# Patient Record
Sex: MALE | Race: WHITE | NOT HISPANIC OR LATINO | Employment: FULL TIME | URBAN - METROPOLITAN AREA
[De-identification: names, ages, dates, MRNs, and addresses within clinical notes are randomized per-mention and may not be internally consistent; named-entity substitution may affect disease eponyms.]

---

## 2018-02-14 ENCOUNTER — CLINICAL SUPPORT (OUTPATIENT)
Dept: URGENT CARE | Facility: CLINIC | Age: 53
End: 2018-02-14

## 2018-02-14 DIAGNOSIS — Z00.00 PHYSICAL EXAM: ICD-10-CM

## 2018-02-14 DIAGNOSIS — Z02.83 ENCOUNTER FOR DRUG SCREENING: Primary | ICD-10-CM

## 2018-02-14 LAB
CTP QC/QA: YES
POC 5 PANEL DRUG SCREEN: NEGATIVE

## 2018-02-14 PROCEDURE — 80305 DRUG TEST PRSMV DIR OPT OBS: CPT | Mod: QW,S$GLB,, | Performed by: NURSE PRACTITIONER

## 2018-02-14 PROCEDURE — 99499 UNLISTED E&M SERVICE: CPT | Mod: S$GLB,,, | Performed by: PREVENTIVE MEDICINE

## 2018-02-14 PROCEDURE — 92552 PURE TONE AUDIOMETRY AIR: CPT | Mod: S$GLB,,, | Performed by: PREVENTIVE MEDICINE

## 2018-05-14 ENCOUNTER — HOSPITAL ENCOUNTER (EMERGENCY)
Facility: HOSPITAL | Age: 53
Discharge: HOME | End: 2018-05-14
Attending: EMERGENCY MEDICINE | Admitting: EMERGENCY MEDICINE
Payer: COMMERCIAL

## 2018-05-14 VITALS
BODY MASS INDEX: 27.31 KG/M2 | RESPIRATION RATE: 18 BRPM | HEIGHT: 64 IN | OXYGEN SATURATION: 94 % | SYSTOLIC BLOOD PRESSURE: 127 MMHG | DIASTOLIC BLOOD PRESSURE: 77 MMHG | TEMPERATURE: 99 F | HEART RATE: 75 BPM | WEIGHT: 160 LBS

## 2018-05-14 DIAGNOSIS — M25.522 ELBOW PAIN, LEFT: Primary | ICD-10-CM

## 2018-05-14 PROCEDURE — 99283 EMERGENCY DEPT VISIT LOW MDM: CPT

## 2018-05-14 PROCEDURE — 25000003 PHARM REV CODE 250: Performed by: PHYSICIAN ASSISTANT

## 2018-05-14 PROCEDURE — 63600175 PHARM REV CODE 636 W HCPCS: Performed by: PHYSICIAN ASSISTANT

## 2018-05-14 PROCEDURE — 90715 TDAP VACCINE 7 YRS/> IM: CPT | Performed by: PHYSICIAN ASSISTANT

## 2018-05-14 PROCEDURE — 90471 IMMUNIZATION ADMIN: CPT | Performed by: PHYSICIAN ASSISTANT

## 2018-05-14 RX ORDER — ACETAMINOPHEN 325 MG/1
650 TABLET ORAL
Status: COMPLETED | OUTPATIENT
Start: 2018-05-14 | End: 2018-05-14

## 2018-05-14 RX ORDER — CEPHALEXIN 500 MG/1
500 CAPSULE ORAL EVERY 12 HOURS
Qty: 20 CAPSULE | Refills: 0 | Status: SHIPPED | OUTPATIENT
Start: 2018-05-14 | End: 2018-05-24

## 2018-05-14 RX ADMIN — ACETAMINOPHEN 650 MG: 325 TABLET ORAL at 09:05

## 2018-05-14 RX ADMIN — CLOSTRIDIUM TETANI TOXOID ANTIGEN (FORMALDEHYDE INACTIVATED), CORYNEBACTERIUM DIPHTHERIAE TOXOID ANTIGEN (FORMALDEHYDE INACTIVATED), BORDETELLA PERTUSSIS TOXOID ANTIGEN (GLUTARALDEHYDE INACTIVATED), BORDETELLA PERTUSSIS FILAMENTOUS HEMAGGLUTININ ANTIGEN (FORMALDEHYDE INACTIVATED), BORDETELLA PERTUSSIS PERTACTIN ANTIGEN, AND BORDETELLA PERTUSSIS FIMBRIAE 2/3 ANTIGEN 0.5 ML: 5; 2; 2.5; 5; 3; 5 INJECTION, SUSPENSION INTRAMUSCULAR at 09:05

## 2018-05-15 NOTE — ED TRIAGE NOTES
Patient presents to the ED with family. Patient reports elbow pain x 1 day. Denies injuring elbow.

## 2018-05-15 NOTE — ED PROVIDER NOTES
Encounter Date: 5/14/2018       History     Chief Complaint   Patient presents with    Elbow Pain     left sided elbow pain and swelling; pt states might be a spider bite; area is red, swollen, and hot     51 yo M with no PMHx presents to ED for L elbow pain noticed after hitting it lightly on his  side window yesterday at 3pm. Notes swelling and redness. Denies Hx of similar symptoms. No medication taken PTA. No exacerbating symptoms. No medication PTA.           Review of patient's allergies indicates:  No Known Allergies  Past Medical History:   Diagnosis Date    Alcohol abuse     Tobacco abuse      Past Surgical History:   Procedure Laterality Date    NO PAST SURGERIES       Family History   Problem Relation Age of Onset    Hypertension Father     Stroke Father     Alcohol abuse Mother      Social History   Substance Use Topics    Smoking status: Current Every Day Smoker     Packs/day: 1.00     Years: 25.00    Smokeless tobacco: Not on file    Alcohol use Yes      Comment: 140 oz/week     Review of Systems   Constitutional: Negative for fever.   Respiratory: Negative for shortness of breath.    Cardiovascular: Negative for chest pain.   Gastrointestinal: Negative for abdominal pain, nausea and vomiting.   Musculoskeletal: Negative for back pain and neck pain.   Skin: Positive for color change.   Neurological: Negative for headaches.   All other systems reviewed and are negative.      Physical Exam     Initial Vitals [05/14/18 2110]   BP Pulse Resp Temp SpO2   120/76 82 17 98.2 °F (36.8 °C) 98 %      MAP       90.67         Physical Exam    Nursing note and vitals reviewed.  Constitutional: He appears well-developed and well-nourished. He is not diaphoretic. No distress.   HENT:   Head: Normocephalic and atraumatic.   Nose: Nose normal.   Eyes: Conjunctivae and EOM are normal. Right eye exhibits no discharge. Left eye exhibits no discharge.   Neck: Normal range of motion. No tracheal deviation  present. No JVD present.   Cardiovascular: Normal rate, regular rhythm and normal heart sounds. Exam reveals no friction rub.    No murmur heard.  Pulmonary/Chest: Breath sounds normal. No stridor. No respiratory distress. He has no wheezes. He has no rhonchi. He has no rales. He exhibits no tenderness.   Musculoskeletal:   Mild erythema and swelling to L elbow with minimal TTP. Full ROM of elbow without complication or pain. No fluctuance or induration. No abrasion or deformity. Radial pulses 2+ and equal.    Neurological: He is alert and oriented to person, place, and time.   Skin: Skin is warm and dry. No rash and no abscess noted. No erythema. No pallor.         ED Course   Procedures  Labs Reviewed - No data to display          Medical Decision Making:   History:   Old Medical Records: I decided to obtain old medical records.  Initial Assessment:   51 yo M with L elbow pain. Denies fever, numbness, and significant trauma.   ED Management:  Patient may have early/mild cellulitis. Less consistent with bursitis, but this remains possible. No abscess. I doubt septic joint.     Tetanus updated. Sent home on Keflex. Pain controlled. Advising PCP follow up. Strict return precautions discussed. Patient agreeable to plan.   Other:   I have discussed this case with another health care provider.       <> Summary of the Discussion: Case discussed with Dr. Patterson who is in agreement with my assessment and plan.                       Clinical Impression:   The encounter diagnosis was Elbow pain, left.                           Ryan Lazo PA-C  05/14/18 [id number]

## 2018-06-04 ENCOUNTER — OFFICE VISIT (OUTPATIENT)
Dept: URGENT CARE | Facility: CLINIC | Age: 53
End: 2018-06-04
Payer: COMMERCIAL

## 2018-06-04 VITALS
TEMPERATURE: 98 F | SYSTOLIC BLOOD PRESSURE: 142 MMHG | BODY MASS INDEX: 25.61 KG/M2 | HEIGHT: 64 IN | WEIGHT: 150 LBS | OXYGEN SATURATION: 98 % | RESPIRATION RATE: 18 BRPM | HEART RATE: 74 BPM | DIASTOLIC BLOOD PRESSURE: 89 MMHG

## 2018-06-04 DIAGNOSIS — F17.200 TOBACCO DEPENDENCE: ICD-10-CM

## 2018-06-04 DIAGNOSIS — M70.22 OLECRANON BURSITIS OF LEFT ELBOW: Primary | ICD-10-CM

## 2018-06-04 PROCEDURE — 99214 OFFICE O/P EST MOD 30 MIN: CPT | Mod: S$GLB,,, | Performed by: NURSE PRACTITIONER

## 2018-06-04 RX ORDER — SULFAMETHOXAZOLE AND TRIMETHOPRIM 800; 160 MG/1; MG/1
1 TABLET ORAL 2 TIMES DAILY
Qty: 14 TABLET | Refills: 0 | Status: SHIPPED | OUTPATIENT
Start: 2018-06-04 | End: 2018-06-11

## 2018-06-04 RX ORDER — NAPROXEN 500 MG/1
500 TABLET ORAL 2 TIMES DAILY WITH MEALS
Qty: 28 TABLET | Refills: 0 | Status: SHIPPED | OUTPATIENT
Start: 2018-06-04 | End: 2018-06-18

## 2018-06-05 NOTE — PROGRESS NOTES
"Subjective:       Patient ID: Bryn Camarillo Jr. is a 52 y.o. male.    Vitals:  height is 5' 4" (1.626 m) and weight is 68 kg (150 lb). His temperature is 97.6 °F (36.4 °C). His blood pressure is 142/89 (abnormal) and his pulse is 74. His respiration is 18 and oxygen saturation is 98%.     Chief Complaint: Joint Swelling    Elbow Injury   This is a new problem. The current episode started more than 1 month ago. The problem occurs constantly. The problem has been unchanged. Associated symptoms include joint swelling. Pertinent negatives include no abdominal pain, chest pain, chills, fever, headaches, nausea, rash, sore throat or vomiting. The symptoms are aggravated by bending. He has tried nothing for the symptoms. The treatment provided no relief.     Review of Systems   Constitution: Negative for chills and fever.   HENT: Negative for sore throat.    Eyes: Negative for blurred vision.   Cardiovascular: Negative for chest pain.   Respiratory: Negative for shortness of breath.    Skin: Negative for rash.   Musculoskeletal: Positive for joint pain and joint swelling. Negative for back pain.   Gastrointestinal: Negative for abdominal pain, diarrhea, nausea and vomiting.   Neurological: Negative for headaches.   Psychiatric/Behavioral: The patient is not nervous/anxious.    All other systems reviewed and are negative.      Objective:      Physical Exam   Constitutional: He is oriented to person, place, and time. He appears well-developed and well-nourished. He is cooperative.  Non-toxic appearance. He does not have a sickly appearance. He does not appear ill. No distress.   HENT:   Head: Normocephalic and atraumatic.   Right Ear: External ear normal.   Left Ear: External ear normal.   Nose: Nose normal.   Cardiovascular: Normal rate, regular rhythm and normal heart sounds.    Pulmonary/Chest: Effort normal and breath sounds normal. No accessory muscle usage. No apnea, no tachypnea and no bradypnea. No respiratory " distress. He has no decreased breath sounds. He has no wheezes. He has no rhonchi. He has no rales.   Musculoskeletal:        Left elbow: He exhibits swelling. He exhibits normal range of motion and no laceration. Tenderness found.        Arms:  Erythema, edema, and warmth noted to left posterior olecranon.    Neurological: He is alert and oriented to person, place, and time. He has normal strength. He displays no atrophy. No sensory deficit. He exhibits normal muscle tone. Coordination and gait normal.   Skin: Skin is warm. Capillary refill takes less than 2 seconds. He is not diaphoretic. There is erythema.   Psychiatric: He has a normal mood and affect. His speech is normal and behavior is normal.   Nursing note and vitals reviewed.      Assessment:       1. Olecranon bursitis of left elbow    2. Tobacco dependence        Plan:         Olecranon bursitis of left elbow  -     sulfamethoxazole-trimethoprim 800-160mg (BACTRIM DS) 800-160 mg Tab; Take 1 tablet by mouth 2 (two) times daily.  Dispense: 14 tablet; Refill: 0  -     naproxen (NAPROSYN) 500 MG tablet; Take 1 tablet (500 mg total) by mouth 2 (two) times daily with meals.  Dispense: 28 tablet; Refill: 0  -     Ambulatory referral to Orthopedics    Tobacco dependence  -     Ambulatory referral to Smoking Cessation Program      Instructed pt to follow up with ortho if issue continues. Pt verbalizes understanding.

## 2018-06-05 NOTE — PATIENT INSTRUCTIONS
Please follow up with your primary care provider if you are not feeling better in 7-10 days.    Please drink plenty of fluids.  Please get plenty of rest.    Please return here or go to the Emergency Department for any concerns or worsening of condition.    Rest, ice, compression and elevation to the affected joint or limb as needed.    Please follow up with your primary care doctor or specialist as needed.    If you  smoke, please stop smoking.    Bursitis of the Elbow (Olecranon)  Your elbow joint contains a small fluid-filled sac called a bursa. The bursa helps the muscles and tendons move smoothly over the bone. It also cushions and protects your elbow. Bursitis is when the bursa is inflamed or swollen. This is most often due to overuse of or injury to the elbow. Symptoms include swelling and pain. If the elbow is red and feels warm to the touch, the bursa itself may be infected.  In most cases, elbow bursitis resolves with medicine and self-care at home. It may take several weeks for the bursa to heal and the swelling to go away. In some cases, your healthcare provider may drain excess fluid from the bursa. Or, he or she may inject medicine directly into the bursa to help relieve symptoms. In severe cases, you may need surgery to remove the bursa may. If there is concern that the bursa is infected, your healthcare provider may prescribe antibiotics to treat the infection.    Home care  Your healthcare provider may prescribe medicine to help relieve pain and swelling. This may be an over-the-counter pain reliever or prescription pain medicine. Take all medicines as directed. To help treat or prevent infection, your provider may prescribe antibiotics. If these are prescribed, take them as directed until they are gone.  The following are general care guidelines:  · Apply an ice pack or bag of frozen peas wrapped in a thin towel to your elbow for 15 to 20 minutes at a time. Do this 3 to 4 times a day until pain and  swelling improve.  · Keep your elbow raised above the level of your heart whenever possible. This helps reduce swelling. When sitting or lying down, place your arm on a pillow that rests on your chest or on a pillow at your side.  · Use an elastic wrap around the elbow joint to compress the area while it is healing. Make the wrap snug but not tight to the point of causing pain.  · Rest your elbow to give it time to heal. You may need to wear an elbow pad to help protect and limit the movement of your elbow. During and after healing, avoid leaning on your elbows.  Follow-up care  Follow up with your healthcare provider, or as advised. If you have been referred to a specialist, make that appointment promptly.  When to seek medical advice  Call your healthcare provider right away if any of these occur:  · Fever of 100.4°F (38°C) or higher, or as advised  · Chills  · Increased pain, swelling, warmth, redness, or drainage from the joint  · Trouble moving the elbow joint  · Numbness or tingling in the hand  · Severe pain or swelling in forearm or hand  · Loss of pink color and slow return of color after squeezing fingertip or hand  Date Last Reviewed: 6/1/2016  © 1266-9425 [employer]. [address] [place] [address], [place] [address]. All rights reserved. This information is not intended as a substitute for professional medical care. Always follow your healthcare professional's instructions.

## 2018-06-30 ENCOUNTER — LAB VISIT (OUTPATIENT)
Dept: LAB | Facility: HOSPITAL | Age: 53
End: 2018-06-30
Attending: FAMILY MEDICINE
Payer: COMMERCIAL

## 2018-06-30 ENCOUNTER — OFFICE VISIT (OUTPATIENT)
Dept: FAMILY MEDICINE | Facility: CLINIC | Age: 53
End: 2018-06-30
Payer: COMMERCIAL

## 2018-06-30 VITALS
HEIGHT: 64 IN | BODY MASS INDEX: 26.02 KG/M2 | SYSTOLIC BLOOD PRESSURE: 138 MMHG | RESPIRATION RATE: 16 BRPM | HEART RATE: 80 BPM | WEIGHT: 152.38 LBS | TEMPERATURE: 98 F | DIASTOLIC BLOOD PRESSURE: 70 MMHG | OXYGEN SATURATION: 96 %

## 2018-06-30 DIAGNOSIS — Z00.00 VISIT FOR WELL MAN HEALTH CHECK: ICD-10-CM

## 2018-06-30 DIAGNOSIS — B18.2 CHRONIC HEPATITIS C WITHOUT HEPATIC COMA: ICD-10-CM

## 2018-06-30 DIAGNOSIS — R10.13 EPIGASTRIC PAIN: ICD-10-CM

## 2018-06-30 DIAGNOSIS — R10.13 EPIGASTRIC PAIN: Primary | ICD-10-CM

## 2018-06-30 LAB
ALBUMIN SERPL BCP-MCNC: 3.8 G/DL
ALP SERPL-CCNC: 95 U/L
ALT SERPL W/O P-5'-P-CCNC: 30 U/L
ANION GAP SERPL CALC-SCNC: 8 MMOL/L
AST SERPL-CCNC: 29 U/L
BASOPHILS # BLD AUTO: 0.04 K/UL
BASOPHILS NFR BLD: 0.5 %
BILIRUB SERPL-MCNC: 0.9 MG/DL
BUN SERPL-MCNC: 14 MG/DL
CALCIUM SERPL-MCNC: 9.8 MG/DL
CHLORIDE SERPL-SCNC: 105 MMOL/L
CHOLEST SERPL-MCNC: 139 MG/DL
CHOLEST/HDLC SERPL: 1.9 {RATIO}
CO2 SERPL-SCNC: 24 MMOL/L
COMPLEXED PSA SERPL-MCNC: 3.3 NG/ML
CREAT SERPL-MCNC: 0.9 MG/DL
DIFFERENTIAL METHOD: ABNORMAL
EOSINOPHIL # BLD AUTO: 0.3 K/UL
EOSINOPHIL NFR BLD: 3.2 %
ERYTHROCYTE [DISTWIDTH] IN BLOOD BY AUTOMATED COUNT: 12.7 %
EST. GFR  (AFRICAN AMERICAN): >60 ML/MIN/1.73 M^2
EST. GFR  (NON AFRICAN AMERICAN): >60 ML/MIN/1.73 M^2
GLUCOSE SERPL-MCNC: 95 MG/DL
HCT VFR BLD AUTO: 52.2 %
HDLC SERPL-MCNC: 72 MG/DL
HDLC SERPL: 51.8 %
HGB BLD-MCNC: 16.9 G/DL
IMM GRANULOCYTES # BLD AUTO: 0.03 K/UL
IMM GRANULOCYTES NFR BLD AUTO: 0.3 %
LDLC SERPL CALC-MCNC: 61 MG/DL
LIPASE SERPL-CCNC: 17 U/L
LYMPHOCYTES # BLD AUTO: 2.2 K/UL
LYMPHOCYTES NFR BLD: 25.2 %
MCH RBC QN AUTO: 31.8 PG
MCHC RBC AUTO-ENTMCNC: 32.4 G/DL
MCV RBC AUTO: 98 FL
MONOCYTES # BLD AUTO: 0.8 K/UL
MONOCYTES NFR BLD: 8.8 %
NEUTROPHILS # BLD AUTO: 5.5 K/UL
NEUTROPHILS NFR BLD: 62 %
NONHDLC SERPL-MCNC: 67 MG/DL
NRBC BLD-RTO: 0 /100 WBC
PLATELET # BLD AUTO: 272 K/UL
PMV BLD AUTO: 9.4 FL
POTASSIUM SERPL-SCNC: 4.3 MMOL/L
PROT SERPL-MCNC: 7.6 G/DL
RBC # BLD AUTO: 5.31 M/UL
SODIUM SERPL-SCNC: 137 MMOL/L
TRIGL SERPL-MCNC: 30 MG/DL
WBC # BLD AUTO: 8.85 K/UL

## 2018-06-30 PROCEDURE — 85025 COMPLETE CBC W/AUTO DIFF WBC: CPT

## 2018-06-30 PROCEDURE — 86677 HELICOBACTER PYLORI ANTIBODY: CPT

## 2018-06-30 PROCEDURE — 99396 PREV VISIT EST AGE 40-64: CPT | Mod: S$GLB,,, | Performed by: FAMILY MEDICINE

## 2018-06-30 PROCEDURE — 87902 NFCT AGT GNTYP ALYS HEP C: CPT

## 2018-06-30 PROCEDURE — 99214 OFFICE O/P EST MOD 30 MIN: CPT | Mod: 25,S$GLB,, | Performed by: FAMILY MEDICINE

## 2018-06-30 PROCEDURE — 36415 COLL VENOUS BLD VENIPUNCTURE: CPT | Mod: PO

## 2018-06-30 PROCEDURE — 80053 COMPREHEN METABOLIC PANEL: CPT

## 2018-06-30 PROCEDURE — 84153 ASSAY OF PSA TOTAL: CPT

## 2018-06-30 PROCEDURE — 83690 ASSAY OF LIPASE: CPT

## 2018-06-30 PROCEDURE — 80061 LIPID PANEL: CPT

## 2018-06-30 PROCEDURE — 87522 HEPATITIS C REVRS TRNSCRPJ: CPT

## 2018-06-30 PROCEDURE — 99999 PR PBB SHADOW E&M-EST. PATIENT-LVL III: CPT | Mod: PBBFAC,,, | Performed by: FAMILY MEDICINE

## 2018-06-30 NOTE — PROGRESS NOTES
"Routine Office Visit    Patient Name: Bryn Camarillo Jr.    : 1965  MRN: [id number]    Subjective:  Bryn is a 52 y.o. male who presents today for:    1. Stomach problems  Patient presenting with "stomach issues" for several months.  He relates this pain to home stressors as he and his wife are taking care of his grandkids.  He describes the pain as a cramping pain across the top of the abdomen.  He does have a history of alcohol abuse and tobacco abuse.  He quit using alcohol 5 days ago and has been just taking PPI and drinking water.  Stomach pain has since resolved.  Denies any n/v/d.  No changes in bowel habits.  No blood in stool and no black stools.  He las lost 8-10 pounds in the past 2 months, but relates that to working outside.      Past Medical History  Past Medical History:   Diagnosis Date    Alcohol abuse     Tobacco abuse        Past Surgical History  Past Surgical History:   Procedure Laterality Date    NO PAST SURGERIES         Family History  Family History   Problem Relation Age of Onset    Hypertension Father     Stroke Father     Alcohol abuse Mother        Social History  Social History     Social History    Marital status:      Spouse name: N/A    Number of children: N/A    Years of education: N/A     Occupational History    Not on file.     Social History Main Topics    Smoking status: Current Every Day Smoker     Packs/day: 1.00     Years: 25.00    Smokeless tobacco: Not on file    Alcohol use Yes      Comment: 140 oz/week    Drug use: No    Sexual activity: Yes     Partners: Female     Other Topics Concern    Not on file     Social History Narrative    No narrative on file       Current Medications  Current Outpatient Prescriptions on File Prior to Visit   Medication Sig Dispense Refill    folic acid (FOLVITE) 1 MG tablet Take 1 tablet (1 mg total) by mouth once daily.  0    omeprazole (PRILOSEC) 40 MG capsule Take 1 capsule (40 mg total) by mouth once " "daily. 30 capsule 3     No current facility-administered medications on file prior to visit.        Allergies   Review of patient's allergies indicates:  No Known Allergies    Review of Systems (Pertinent positives)  Review of Systems   Constitutional: Negative.    HENT: Negative.    Eyes: Negative.    Respiratory: Negative.    Cardiovascular: Negative.    Gastrointestinal: Positive for abdominal pain. Negative for heartburn, nausea and vomiting.   Genitourinary: Negative.    Musculoskeletal: Negative.    Skin: Negative.    Neurological: Negative.          /70 (BP Location: Left arm, Patient Position: Sitting, BP Method: Medium (Manual))   Pulse 80   Temp 97.5 °F (36.4 °C) (Oral)   Resp 16   Ht 5' 4" (1.626 m)   Wt 69.1 kg (152 lb 6.4 oz)   SpO2 96%   BMI 26.16 kg/m²     GENERAL APPEARANCE: in no apparent distress and well developed and well nourished  HEENT: PERRL, EOMI, Sclera clear, anicteric, Oropharynx clear, no lesions, Neck supple with midline trachea  NECK: normal, supple, no adenopathy, thyroid normal in size  RESPIRATORY: appears well, vitals normal, no respiratory distress, acyanotic, normal RR, chest clear, no wheezing, crepitations, rhonchi, normal symmetric air entry  HEART: regular rate and rhythm, S1, S2 normal, no murmur, click, rub or gallop.    ABDOMEN: abdomen is soft without tenderness, no masses, no hernias, no organomegaly, no rebound, no guarding. Suprapubic tenderness absent. No CVA tenderness.  SKIN: no rashes, no wounds, no other lesions  PSYCH: Alert, oriented x 3, thought content appropriate, speech normal, pleasant and cooperative, good eye contact, well groomed    Assessment/Plan:  Bryn Camarillo Jr. is a 52 y.o. male who presents today for :    Bryn was seen today for abdominal pain.    Diagnoses and all orders for this visit:    Epigastric pain  -     Lipase; Future  -     H. PYLORI ANTIBODY, IGG; Future    Chronic hepatitis C without hepatic coma  -     HEPATITIS C " GENOTYPE; Future      1.  Labs to be done today  2.  Will set up to see Dr. Dumont for Hep C  3.  Avoid alcoholic beverages and acidic beverages such as juices  4.  Patient to take PPI he has at home and drink water  5.  Encourage abstaining from tobacco as well as this will aggravate pain and increase risk for lung, colon, and pancreatic CA's amongst others    Yasmani Hernández MD

## 2018-06-30 NOTE — PROGRESS NOTES
"Well Man VISIT      CHIEF COMPLAINT  Chief Complaint   Patient presents with    Abdominal Pain     fasting       HPI  Bryn Camarillo Jr. is a 52 y.o. male who presents for physical.     Social Factors  Tobacco use: Yes   Ready to Quit: No  Alcohol: Yes 6 pack of beer a day  Intimate partner violence screening  "Do you feel safe in your current relationship?" Yes   "Have you ever been in a relationship in which your partner frightened you or hurt you?" No  Living Will/POA: No  Regular Exercise: No    Depression  Over the past two weeks, have you felt down, depressed, or hopeless? No  Over the past two weeks, have you felt little interest or pleasure in doing things? No    Reproductive Health  STD screening in last year: deferred  HIV screening: deferred    Screen for Chronic Disease  CHD Risk Factors: male gender and smoking/ tobacco exposure  Estimated body mass index is 26.16 kg/m² as calculated from the following:    Height as of this encounter: 5' 4" (1.626 m).    Weight as of this encounter: 69.1 kg (152 lb 6.4 oz).  Dyslipidemia screening needed: order 6/30/18  T2DM screening needed: order 6/30/18  Colonoscopy needed: order 6/30/18  PSA needed: order 6/30/18  AAA screening needed:n/a  Screen men 35 years and older, and men 20 to 34 years of age who have cardiovascular risk factors for dyslipidemia  Begin screening colonoscopies at 50 years of age in men of average risk, and continue until 75 years of age; offer fecal occult blood testing every year, flexible sigmoidoscopy every five years combined with fecal occult blood testing every three years, or colonoscopy every 10 years   The American U[employer] Association recommends offering PSA testing and digital rectal examination to well-informed men beginning at 40 years of age and continuing until life expectancy is less than 10 years  Screen once with ultrasonography in men 65 to 75 years of age if they have a family history or have smoked at [id number] " "cigarettes in their lifetime  Screen men with a sustained blood pressure greater than 135/80 mm Hg for T2DM      Immunizations  delayed    ALLERGIES and MEDS were verified.   PMHx, PSHx, FHx, SOCIALHx were updated as pertinent.    REVIEW OF SYSTEMS  Review of Systems   Constitutional: Negative.    HENT: Negative.    Eyes: Negative.    Respiratory: Negative.    Cardiovascular: Negative.    Gastrointestinal: Positive for abdominal pain. Negative for blood in stool, constipation, diarrhea, heartburn, melena, nausea and vomiting.   Genitourinary: Negative.    Musculoskeletal: Negative.    Skin: Negative.    Neurological: Negative.        PHYSICAL EXAM  VITAL SIGNS: /70 (BP Location: Left arm, Patient Position: Sitting, BP Method: Medium (Manual))   Pulse 80   Temp 97.5 °F (36.4 °C) (Oral)   Resp 16   Ht 5' 4" (1.626 m)   Wt 69.1 kg (152 lb 6.4 oz)   SpO2 96%   BMI 26.16 kg/m²   GEN: Well developed, Well nourished, No acute distress.  HENT: Normocephalic, Atraumatic, Bilateral external ears normal, Nose normal, Oropharynx moist, No oral exudates.   Eyes: PERRL, EOMI, Conjunctiva normal, No discharge.   Neck: Supple, No tenderness.  Lymphatic: No cervical or supraclavicular lymphadenopathy noted.   Cardiovascular: Normal heart rate, Normal rhythm, No murmurs, No rubs, No gallops.   Thorax & Lungs: Normal breath sounds, No respiratory distress, No wheezing.  Abdomen: Soft, No tenderness, Bowel sounds normal.  Genital: deferred  Skin: Warm, Dry, No erythema, No rash.   Extremities: No edema, No tenderness.       ASSESSMENT/PLAN    Bryn was seen today for abdominal pain.    Diagnoses and all orders for this visit:      Visit for [facility] health check  -     Comprehensive metabolic panel; Future  -     CBC auto differential; Future  -     Lipid panel; Future  -     Urinalysis; Future  -     PSA, Screening; Future  -     Case request GI: COLONOSCOPY         FOLLOW UP: 3 months or sooner if needed    Yasmani MCGOVERN" MD Betty

## 2018-07-02 LAB — H PYLORI IGG SERPL QL IA: POSITIVE

## 2018-07-06 LAB
HCV GENTYP SERPL NAA+PROBE: ABNORMAL
HCV RNA SERPL NAA+PROBE-LOG IU: 5.77 LOG (10) IU/ML
HCV RNA SERPL QL NAA+PROBE: DETECTED
HCV RNA SPEC NAA+PROBE-ACNC: ABNORMAL IU/ML

## 2018-07-10 ENCOUNTER — TELEPHONE (OUTPATIENT)
Dept: FAMILY MEDICINE | Facility: CLINIC | Age: 53
End: 2018-07-10

## 2018-07-10 NOTE — PROGRESS NOTES
Please let patient know that he tested positive for a bacteria in his stomach that could be the cause of his pain.  Please check to see if he has ever been treated for a stomach infection call H. Pylori.  If not, I am going to start him on treatment.    Thanks,  Dr. Hernández

## 2018-07-11 ENCOUNTER — OFFICE VISIT (OUTPATIENT)
Dept: FAMILY MEDICINE | Facility: CLINIC | Age: 53
End: 2018-07-11
Payer: COMMERCIAL

## 2018-07-11 ENCOUNTER — LAB VISIT (OUTPATIENT)
Dept: LAB | Facility: HOSPITAL | Age: 53
End: 2018-07-11
Attending: FAMILY MEDICINE
Payer: COMMERCIAL

## 2018-07-11 ENCOUNTER — TELEPHONE (OUTPATIENT)
Dept: FAMILY MEDICINE | Facility: CLINIC | Age: 53
End: 2018-07-11

## 2018-07-11 VITALS
RESPIRATION RATE: 17 BRPM | SYSTOLIC BLOOD PRESSURE: 128 MMHG | HEIGHT: 64 IN | OXYGEN SATURATION: 97 % | HEART RATE: 73 BPM | WEIGHT: 155.63 LBS | DIASTOLIC BLOOD PRESSURE: 76 MMHG | BODY MASS INDEX: 26.57 KG/M2 | TEMPERATURE: 98 F

## 2018-07-11 DIAGNOSIS — A04.8 H. PYLORI INFECTION: ICD-10-CM

## 2018-07-11 DIAGNOSIS — Z12.11 SCREEN FOR COLON CANCER: ICD-10-CM

## 2018-07-11 DIAGNOSIS — B18.2 CHRONIC HEPATITIS C WITHOUT HEPATIC COMA: Primary | ICD-10-CM

## 2018-07-11 DIAGNOSIS — B18.2 CHRONIC HEPATITIS C WITHOUT HEPATIC COMA: ICD-10-CM

## 2018-07-11 LAB
AFP SERPL-MCNC: 1.8 NG/ML
INR PPP: 1
PROTHROMBIN TIME: 10.8 SEC

## 2018-07-11 PROCEDURE — 99214 OFFICE O/P EST MOD 30 MIN: CPT | Mod: S$GLB,,, | Performed by: FAMILY MEDICINE

## 2018-07-11 PROCEDURE — 87902 NFCT AGT GNTYP ALYS HEP C: CPT

## 2018-07-11 PROCEDURE — 82105 ALPHA-FETOPROTEIN SERUM: CPT

## 2018-07-11 PROCEDURE — 87517 HEPATITIS B DNA QUANT: CPT

## 2018-07-11 PROCEDURE — 86704 HEP B CORE ANTIBODY TOTAL: CPT

## 2018-07-11 PROCEDURE — 99999 PR PBB SHADOW E&M-EST. PATIENT-LVL III: CPT | Mod: PBBFAC,,, | Performed by: FAMILY MEDICINE

## 2018-07-11 PROCEDURE — 85610 PROTHROMBIN TIME: CPT

## 2018-07-11 PROCEDURE — 82977 ASSAY OF GGT: CPT

## 2018-07-11 PROCEDURE — 86706 HEP B SURFACE ANTIBODY: CPT

## 2018-07-11 PROCEDURE — 36415 COLL VENOUS BLD VENIPUNCTURE: CPT | Mod: PN

## 2018-07-11 PROCEDURE — 87340 HEPATITIS B SURFACE AG IA: CPT

## 2018-07-11 RX ORDER — AMOXICILLIN 500 MG/1
1000 TABLET, FILM COATED ORAL 2 TIMES DAILY
Qty: 56 TABLET | Refills: 0 | Status: SHIPPED | OUTPATIENT
Start: 2018-07-11 | End: 2018-07-25

## 2018-07-11 RX ORDER — CLARITHROMYCIN 500 MG/1
500 TABLET, FILM COATED ORAL 2 TIMES DAILY
Qty: 28 TABLET | Refills: 0 | Status: SHIPPED | OUTPATIENT
Start: 2018-07-11 | End: 2018-07-25

## 2018-07-11 RX ORDER — OMEPRAZOLE 20 MG/1
20 CAPSULE, DELAYED RELEASE ORAL 2 TIMES DAILY
Qty: 28 CAPSULE | Refills: 0 | Status: SHIPPED | OUTPATIENT
Start: 2018-07-11 | End: 2018-09-06

## 2018-07-11 NOTE — TELEPHONE ENCOUNTER
----- Message from Yasmani Hernández MD sent at 7/10/2018  8:53 AM CDT -----  Please let patient know that he tested positive for a bacteria in his stomach that could be the cause of his pain.  Please check to see if he has ever been treated for a stomach infection call H. Pylori.  If not, I am going to start him on treatment.    Thanks,  Dr. Hernández

## 2018-07-11 NOTE — PROGRESS NOTES
Routine Office Visit    Patient Name: Bryn Camarillo Jr.    : 1965  MRN: [id number]    Subjective:  Bryn is a 52 y.o. male who presents today for:   Chief Complaint   Patient presents with    Hepatitis C       52 year old male comes in for evaluation of hepatitis C. He reports he was first told about hepatitis C a year ago. He was never treated. He states he does have a history of drug use, but has not done any in a long time.     The patient's recent blood tests show genotype 1a and Fib-4 score consistent with F0-F1.   Recent tests also show H. Pylori positive. He has never been treated for this.     Past Medical History  Past Medical History:   Diagnosis Date    Alcohol abuse     Tobacco abuse        Past Surgical History  Past Surgical History:   Procedure Laterality Date    NO PAST SURGERIES          Family History  Family History   Problem Relation Age of Onset    Hypertension Father     Stroke Father     Alcohol abuse Mother        Social History  Social History     Social History    Marital status:      Spouse name: N/A    Number of children: N/A    Years of education: N/A     Occupational History    Not on file.     Social History Main Topics    Smoking status: Current Every Day Smoker     Packs/day: 1.00     Years: 25.00    Smokeless tobacco: Not on file    Alcohol use Yes      Comment: 140 oz/week    Drug use: No    Sexual activity: Yes     Partners: Female     Other Topics Concern    Not on file     Social History Narrative    No narrative on file       Current Medications  Current Outpatient Prescriptions on File Prior to Visit   Medication Sig Dispense Refill    folic acid (FOLVITE) 1 MG tablet Take 1 tablet (1 mg total) by mouth once daily.  0    [DISCONTINUED] omeprazole (PRILOSEC) 40 MG capsule Take 1 capsule (40 mg total) by mouth once daily. 30 capsule 3     No current facility-administered medications on file prior to visit.        Allergies   Review of  "patient's allergies indicates:  No Known Allergies    Review of Systems   Constitutional: Negative for unexpected weight change.   HENT: Negative for ear pain and sore throat.    Eyes: Negative for visual disturbance.   Respiratory: Negative for shortness of breath.    Cardiovascular: Negative for chest pain.   Gastrointestinal: Negative for abdominal pain and blood in stool.   Endocrine: Negative for cold intolerance and heat intolerance.   Genitourinary: Negative for dysuria and frequency.   Skin: Negative for rash.   Neurological: Negative for weakness, numbness and headaches.   Hematological: Negative for adenopathy.   Psychiatric/Behavioral: Negative for suicidal ideas.       /76 (BP Location: Left arm, Patient Position: Sitting, BP Method: Medium (Manual))   Pulse 73   Temp 97.6 °F (36.4 °C) (Oral)   Resp 17   Ht 5' 4" (1.626 m)   Wt 70.6 kg (155 lb 10.3 oz)   SpO2 97%   BMI 26.72 kg/m²     Physical Exam   Constitutional: He appears well-developed and well-nourished.   HENT:   Head: Normocephalic.   Right Ear: External ear normal.   Left Ear: External ear normal.   Nose: Nose normal.   Mouth/Throat: No oropharyngeal exudate.   Neck: Normal range of motion. Neck supple. No tracheal deviation present.   Cardiovascular: Normal rate, regular rhythm, normal heart sounds and intact distal pulses.    No murmur heard.  Pulmonary/Chest: Effort normal and breath sounds normal. He has no wheezes. He has no rales.   Abdominal: Soft. Bowel sounds are normal. He exhibits no mass. There is no tenderness.   Musculoskeletal: He exhibits no edema.   Lymphadenopathy:     He has no cervical adenopathy.   Skin: He is not diaphoretic.   Vitals reviewed.      Lab Visit on 06/30/2018   Component Date Value Ref Range Status    Sodium 06/30/2018 137  136 - 145 mmol/L Final    Potassium 06/30/2018 4.3  3.5 - 5.1 mmol/L Final    Chloride 06/30/2018 105  95 - 110 mmol/L Final    CO2 06/30/2018 24  23 - 29 mmol/L Final    " Glucose 06/30/2018 95  70 - 110 mg/dL Final    BUN, Bld 06/30/2018 14  6 - 20 mg/dL Final    Creatinine 06/30/2018 0.9  0.5 - 1.4 mg/dL Final    Calcium 06/30/2018 9.8  8.7 - 10.5 mg/dL Final    Total Protein 06/30/2018 7.6  6.0 - 8.4 g/dL Final    Albumin 06/30/2018 3.8  3.5 - 5.2 g/dL Final    Total Bilirubin 06/30/2018 0.9  0.1 - 1.0 mg/dL Final    Comment: For infants and newborns, interpretation of results should be based  on gestational age, weight and in agreement with clinical  observations.  Premature Infant recommended reference ranges:  Up to 24 hours.............<8.0 mg/dL  Up to 48 hours............<12.0 mg/dL  3-5 days..................<15.0 mg/dL  6-29 days.................<15.0 mg/dL      Alkaline Phosphatase 06/30/2018 95  55 - 135 U/L Final    AST 06/30/2018 29  10 - 40 U/L Final    ALT 06/30/2018 30  10 - 44 U/L Final    Anion Gap 06/30/2018 8  8 - 16 mmol/L Final    eGFR if African American 06/30/2018 >60.0  >60 mL/min/1.73 m^2 Final    eGFR if non African American 06/30/2018 >60.0  >60 mL/min/1.73 m^2 Final    Comment: Calculation used to obtain the estimated glomerular filtration  rate (eGFR) is the CKD-EPI equation.       WBC 06/30/2018 8.85  3.90 - 12.70 K/uL Final    RBC 06/30/2018 5.31  4.60 - 6.20 M/uL Final    Hemoglobin 06/30/2018 16.9  14.0 - 18.0 g/dL Final    Hematocrit 06/30/2018 52.2  40.0 - 54.0 % Final    MCV 06/30/2018 98  82 - 98 fL Final    MCH 06/30/2018 31.8* 27.0 - 31.0 pg Final    MCHC 06/30/2018 32.4  32.0 - 36.0 g/dL Final    RDW 06/30/2018 12.7  11.5 - 14.5 % Final    Platelets 06/30/2018 272  150 - 350 K/uL Final    MPV 06/30/2018 9.4  9.2 - 12.9 fL Final    Immature Granulocytes 06/30/2018 0.3  0.0 - 0.5 % Final    Gran # (ANC) 06/30/2018 5.5  1.8 - 7.7 K/uL Final    Immature Grans (Abs) 06/30/2018 0.03  0.00 - 0.04 K/uL Final    Comment: Mild elevation in immature granulocytes is non specific and   can be seen in a variety of conditions  including stress response,   acute inflammation, trauma and pregnancy. Correlation with other   laboratory and clinical findings is essential.      Lymph # 06/30/2018 2.2  1.0 - 4.8 K/uL Final    Mono # 06/30/2018 0.8  0.3 - 1.0 K/uL Final    Eos # 06/30/2018 0.3  0.0 - 0.5 K/uL Final    Baso # 06/30/2018 0.04  0.00 - 0.20 K/uL Final    nRBC 06/30/2018 0  0 /100 WBC Final    Gran% 06/30/2018 62.0  38.0 - 73.0 % Final    Lymph% 06/30/2018 25.2  18.0 - 48.0 % Final    Mono% 06/30/2018 8.8  4.0 - 15.0 % Final    Eosinophil% 06/30/2018 3.2  0.0 - 8.0 % Final    Basophil% 06/30/2018 0.5  0.0 - 1.9 % Final    Differential Method 06/30/2018 Automated   Final    Cholesterol 06/30/2018 139  120 - 199 mg/dL Final    Comment: The National Cholesterol Education Program (NCEP) has set the  following guidelines (reference ranges) for Cholesterol:  Optimal.....................<200 mg/dL  Borderline High.............200-239 mg/dL  High........................> or = 240 mg/dL      Triglycerides 06/30/2018 30  30 - 150 mg/dL Final    Comment: The National Cholesterol Education Program (NCEP) has set the  following guidelines (reference values) for triglycerides:  Normal......................<150 mg/dL  Borderline High.............150-199 mg/dL  High........................200-499 mg/dL      HDL 06/30/2018 72  40 - 75 mg/dL Final    Comment: The National Cholesterol Education Program (NCEP) has set the  following guidelines (reference values) for HDL Cholesterol:  Low...............<40 mg/dL  Optimal...........>60 mg/dL      LDL Cholesterol 06/30/2018 61.0* 63.0 - 159.0 mg/dL Final    Comment: The National Cholesterol Education Program (NCEP) has set the  following guidelines (reference values) for LDL Cholesterol:  Optimal.......................<130 mg/dL  Borderline High...............130-159 mg/dL  High..........................160-189 mg/dL  Very High.....................>190 mg/dL      HDL/Chol Ratio 06/30/2018  51.8* 20.0 - 50.0 % Final    Total Cholesterol/HDL Ratio 06/30/2018 1.9* 2.0 - 5.0 Final    Non-HDL Cholesterol 06/30/2018 67  mg/dL Final    Comment: Risk category and Non-HDL cholesterol goals:  Coronary heart disease (CHD)or equivalent (10-year risk of CHD >20%):  Non-HDL cholesterol goal     <130 mg/dL  Two or more CHD risk factors and 10-year risk of CHD <= 20%:  Non-HDL cholesterol goal     <160 mg/dL  0 to 1 CHD risk factor:  Non-HDL cholesterol goal     <190 mg/dL      Lipase 06/30/2018 17  4 - 60 U/L Final    PSA, SCREEN 06/30/2018 3.3  0.00 - 4.00 ng/mL Final    Comment: PSA Expected levels:  Hormonal Therapy: <0.05 ng/ml  Prostatectomy: <0.01 ng/ml  Radiation Therapy: <1.00 ng/ml      HCV Qualitative Result 06/30/2018 DETECTED* Not detected Final    HCV Quantitative Result 06/30/2018 593,651* <12 IU/mL Final    HCV Quantitative Log 06/30/2018 5.77* <1.08 Log (10) IU/mL Final    Hepatitis C Virus Genotype 06/30/2018 1a*  Final    Comment: The HCV quantitation (viral load) procedure utilizes a   real-time reverse transcriptase polymerase chain reaction  (PCR) test from [facility].  The amplification  target is a conserved region of the HCV genome.  The  lower limit of quantitation is 12 IU/mL (1.08 Log IU/mL)  and the upper limit of quantitation is 100 million IU/mL  8.00 log IU/mL).  The qualitative limit of detection  is 12 IU/mL (1.08 Log IU/mL).  The hepatitis C virus (HCV) genotype was determined using  reverse transcription and PCR amplification of the 5   untranslated region and the core region of the HCV genome  followed by electrochemical detection (Kivra XT8).   Specimens with HCV viral loads <625 IU/mL cannot be  genotyped.  These tests should not be used to establish a diagnosis  of HCV infection.    The HCV genotype test uses commercial reagents that have  not been approved or cleared by the FDA.  The FDA has  determined that such clearance or approval is not  necessary.  The  performance characterist                           ics of this  procedure were determined by [facility].   Test performed at [facility],  [address], [place]  [address]     [phone]  Jose Juan Palomares MD  - Medical Director      H Pylori IgG Interp 06/30/2018 Positive* Negative Final         Assessment/Plan:  Bryn was seen today for hepatitis c.    Diagnoses and all orders for this visit:    Chronic hepatitis C without hepatic coma  -     Hepatitis B surface antigen; Future  -     Hepatitis B core antibody, total; Future  -     Hepatitis B surface antibody; Future  -     HCV FibroSURE; Future  -     AFP tumor marker; Future  -     Protime-INR; Future  -     HEPATITIS B VIRAL DNA, QUANTITATIVE; Future  Discussed with patient long term consequences of hepatitis C, as well as benefits, risks, and side effects of treatment. Discussed modes of transmission. Encouraged patient not to share items which may lead to contamination/transmission such as toothbrushes, razor blades, and nail clippers.  Hepatitis C labs done today.    Reviewed medication interactions.  Will need Ns5a test, since genotype 1.  Once labs back will send for prior authorization.   Also advised no alcohol while on treatment    H. pylori infection  -     amoxicillin (AMOXIL) 500 MG Tab; Take 2 tablets (1,000 mg total) by mouth 2 (two) times daily. for 14 days  -     clarithromycin (BIAXIN) 500 MG tablet; Take 1 tablet (500 mg total) by mouth 2 (two) times daily. for 14 days  -     omeprazole (PRILOSEC) 20 MG capsule; Take 1 capsule (20 mg total) by mouth 2 (two) times daily. for 14 days  Discussed treatment options  Will do 2 week course of above regimen.   Will need test of cure 1-2 months after treatment end with stool testing.    Screen for colon cancer  -     Fecal Immunochemical Test (iFOBT); Future  No family history of colon cancer and no rectal bleeding as such patient is a candidate for fecal occult  immuno-testing and patient prefers this.

## 2018-07-11 NOTE — TELEPHONE ENCOUNTER
Patient was notified of chart test result with charted recommendations. Patient said that he saw Dr Dumont this morning and he prescribed medications for his stomach.

## 2018-07-12 LAB
A2 MACROGLOB SERPL-MCNC: 189 MG/DL
ALT SERPL W P-5'-P-CCNC: 70 U/L (ref 7–55)
APO A-I SERPL-MCNC: 210 MG/DL
BILIRUB SERPL-MCNC: 0.9 MG/DL
BIOPREDICTIVE SERIAL NUMBER: ABNORMAL
FIBROSIS STAGE SERPL QL: ABNORMAL
FIBROTEST INTERPRETATION: ABNORMAL
FIBROTEST-ACTITEST COMMENT: ABNORMAL
GGT SERPL-CCNC: 39 U/L
HAPTOGLOB SERPL-MCNC: 154 MG/DL
HBV CORE AB SERPL QL IA: POSITIVE
HBV SURFACE AB SER-ACNC: NEGATIVE M[IU]/ML
HBV SURFACE AG SERPL QL IA: NEGATIVE
HEPATITIS B VIRAL DNA - QUANTITATIVE: <10 IU/ML
HEPATITIS B VIRUS DNA: NOT DETECTED
LIVER FIBR SCORE SERPL CALC.FIBROSURE: 0.17
LOG HBV IU/ML: <1 LOG (10) IU/ML
NECROINFLAMMAT INTERP: ABNORMAL
NECROINFLAMMATORY ACT GRADE SERPL QL: ABNORMAL
NECROINFLAMMATORY ACT SCORE SERPL: 0.37

## 2018-07-19 LAB
DACLATASVIR RESISTANCE RESULT1: NORMAL
ELBASVIR RESISTANCE RESULT1: NORMAL
HCV NS3 MUT DET ISLT GENOTYP: NORMAL
LEDIPASVIR RESISTANCE RESULT1: NORMAL
OMBITASVIR RESISTANCE: NORMAL
VELPATASVIR RESISTANCE RESULT1: NORMAL

## 2018-07-20 ENCOUNTER — OFFICE VISIT (OUTPATIENT)
Dept: OCCUPATIONAL MEDICINE | Facility: CLINIC | Age: 53
End: 2018-07-20
Payer: COMMERCIAL

## 2018-07-20 ENCOUNTER — TELEPHONE (OUTPATIENT)
Dept: FAMILY MEDICINE | Facility: CLINIC | Age: 53
End: 2018-07-20

## 2018-07-20 VITALS
WEIGHT: 155 LBS | BODY MASS INDEX: 26.46 KG/M2 | HEIGHT: 64 IN | RESPIRATION RATE: 12 BRPM | TEMPERATURE: 98 F | DIASTOLIC BLOOD PRESSURE: 80 MMHG | HEART RATE: 76 BPM | SYSTOLIC BLOOD PRESSURE: 120 MMHG

## 2018-07-20 DIAGNOSIS — Z02.83 ENCOUNTER FOR DRUG SCREENING: Primary | ICD-10-CM
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
## 2018-07-20 DIAGNOSIS — S51.812A LACERATION OF LEFT FOREARM, INITIAL ENCOUNTER: ICD-10-CM

## 2018-07-20 LAB
CTP QC/QA: YES
POC 10 PANEL DRUG SCREEN: NORMAL
POC BREATH ALCOHOL: NORMAL

## 2018-07-20 PROCEDURE — 80305 DRUG TEST PRSMV DIR OPT OBS: CPT | Mod: QW,S$GLB,, | Performed by: NURSE PRACTITIONER

## 2018-07-20 PROCEDURE — 12032 INTMD RPR S/A/T/EXT 2.6-7.5: CPT | Mod: S$GLB,,, | Performed by: NURSE PRACTITIONER

## 2018-07-20 PROCEDURE — 82075 ASSAY OF BREATH ETHANOL: CPT | Mod: S$GLB,,, | Performed by: NURSE PRACTITIONER

## 2018-07-20 PROCEDURE — 99203 OFFICE O/P NEW LOW 30 MIN: CPT | Mod: 25,S$GLB,, | Performed by: NURSE PRACTITIONER

## 2018-07-20 RX ORDER — SULFAMETHOXAZOLE AND TRIMETHOPRIM 800; 160 MG/1; MG/1
1 TABLET ORAL 2 TIMES DAILY
Qty: 20 TABLET | Refills: 0 | Status: SHIPPED | OUTPATIENT
Start: 2018-07-20 | End: 2018-07-20 | Stop reason: CLARIF

## 2018-07-20 RX ORDER — DOXYCYCLINE 100 MG/1
100 CAPSULE ORAL 2 TIMES DAILY
Qty: 20 CAPSULE | Refills: 0 | Status: SHIPPED | OUTPATIENT
Start: 2018-07-20 | End: 2018-07-30

## 2018-07-20 NOTE — PROCEDURES
Laceration Repair  Date/Time: 7/20/2018 4:28 PM  Performed by: SAMMY CALLES  Authorized by: SAMMY CALLES   Body area: upper extremity  Location details: left lower arm  Laceration length: 5.5 cm  Foreign bodies: no foreign bodies  Tendon involvement: none  Nerve involvement: none  Vascular damage: no  Anesthesia: local infiltration    Anesthesia:  Local Anesthetic: lidocaine 1% without epinephrine  Anesthetic total: 4 mL  Patient sedated: no  Irrigation solution: saline  Irrigation method: syringe  Amount of cleaning: extensive  Debridement: minimal  Degree of undermining: none  Skin closure: 4-0 Prolene  Number of sutures: 9  Technique: simple  Approximation: close  Approximation difficulty: complex  Dressing: non-stick sterile dressing  Patient tolerance: Patient tolerated the procedure well with no immediate complications  Comments: Jagged edged wound with some difficulty approximating wound edges due to irregular borders and missing skin. Range of motion, circulation and sensation intact prior to and post laceration procedure.  Patient tolerated procedure well and verbalizes understanding concerning wound care and medication prescribed for this injury.

## 2018-07-20 NOTE — PROGRESS NOTES
Subjective:       Patient ID: Bryn Camarillo Jr. is a 52 y.o. male.    Chief Complaint: Arm Injury (Forearm)    Pt works for [employer]. Pt states he was at work working on an icebox in the  cleaning it out. Then he tried to lift the ice box and was unaware that they has a flashing sticking out and it cut him on his left forearm. IJ       Arm Injury    The incident occurred 1 to 3 hours ago. The incident occurred at work. The injury mechanism was a direct blow. The pain is present in the left forearm. The quality of the pain is described as aching (stinging). The pain does not radiate. The pain is at a severity of 7/10. The pain is mild. The pain has been constant since the incident. Associated symptoms include tingling. Pertinent negatives include no chest pain. Nothing aggravates the symptoms. He has tried nothing for the symptoms. The treatment provided no relief.     Review of Systems   Constitution: Negative for chills and fever.   HENT: Negative for sore throat.    Eyes: Negative for blurred vision.   Cardiovascular: Negative for chest pain, dyspnea on exertion and palpitations.   Respiratory: Negative for cough and shortness of breath.    Hematologic/Lymphatic: Negative for bleeding problem.   Skin: Positive for color change. Negative for rash.        laceration left forarm   Musculoskeletal: Negative for back pain and joint pain.   Gastrointestinal: Negative for abdominal pain, diarrhea, heartburn, jaundice, nausea and vomiting.   Genitourinary: Negative for hematuria.   Neurological: Positive for tingling. Negative for dizziness, headaches, sensory change and vertigo.   Psychiatric/Behavioral: Negative for altered mental status. The patient is not nervous/anxious.    Allergic/Immunologic: Negative for persistent infections.       Objective:      Physical Exam   Constitutional: He is oriented to person, place, and time. He appears well-developed and well-nourished.   Eyes: EOM are normal. Pupils are  equal, round, and reactive to light.   Neck: Normal range of motion. Neck supple.   Cardiovascular: Normal rate and regular rhythm.    Pulmonary/Chest: Effort normal and breath sounds normal.   Abdominal: Soft. Bowel sounds are normal.   Musculoskeletal: Normal range of motion.        Left shoulder: Normal.        Left elbow: Normal.        Left wrist: Normal.        Left upper arm: Normal.        Left forearm: He exhibits tenderness, swelling and laceration. He exhibits no bony tenderness.        Left hand: Normal.   Neurological: He is alert and oriented to person, place, and time. He has normal strength. No cranial nerve deficit or sensory deficit. GCS eye subscore is 4. GCS verbal subscore is 5. GCS motor subscore is 6.   Reflex Scores:       Bicep reflexes are 2+ on the right side and 2+ on the left side.       Brachioradialis reflexes are 2+ on the right side and 2+ on the left side.  NV intact left upper extremity   Skin: Skin is warm and dry. Capillary refill takes 2 to 3 seconds. Laceration noted.        Psychiatric: He has a normal mood and affect. His behavior is normal.       Assessment:       1. Encounter for drug screening    2. Laceration of left forearm, initial encounter    3. Work related injury        Plan:       Bryn was seen today for arm injury.    Diagnoses and all orders for this visit:    Encounter for drug screening  -     POCT Rapid Drug Screen 10 Panel  -     POCT alcohol breath test    Laceration of left forearm, initial encounter  -     Discontinue: sulfamethoxazole-trimethoprim 800-160mg (BACTRIM DS) 800-160 mg Tab; Take 1 tablet by mouth 2 (two) times daily. for 10 days  -     doxycycline (VIBRAMYCIN) 100 MG Cap; Take 1 capsule (100 mg total) by mouth 2 (two) times daily. for 10 days  -     LACERATION REPAIR    Work related injury  -     Discontinue: sulfamethoxazole-trimethoprim 800-160mg (BACTRIM DS) 800-160 mg Tab; Take 1 tablet by mouth 2 (two) times daily. for 10 days  -      LACERATION REPAIR    Bryn was seen today for arm injury.    Diagnoses and all orders for this visit:    Encounter for drug screening  -     POCT Rapid Drug Screen 10 Panel  -     POCT alcohol breath test    Laceration of left forearm, initial encounter  -     Discontinue: sulfamethoxazole-trimethoprim 800-160mg (BACTRIM DS) 800-160 mg Tab; Take 1 tablet by mouth 2 (two) times daily. for 10 days  -     doxycycline (VIBRAMYCIN) 100 MG Cap; Take 1 capsule (100 mg total) by mouth 2 (two) times daily. for 10 days  -     LACERATION REPAIR    Work related injury  -     Discontinue: sulfamethoxazole-trimethoprim 800-160mg (BACTRIM DS) 800-160 mg Tab; Take 1 tablet by mouth 2 (two) times daily. for 10 days  -     LACERATION REPAIR        Medications Ordered This Encounter      doxycycline (VIBRAMYCIN) 100 MG Cap          Sig: Take 1 capsule (100 mg total) by mouth 2 (two) times daily. for 10 days          Dispense:  20 capsule          Refill:  0  Patient Instructions: Attention not to aggravate affected area, Apply ice 24-48 hours then apply heat/warm soaks, Keep dressing clean/dry/covered (no not get wound wet.  Wound checks daily monitor for signs of infection: area becomes more red, warm, more painful, drainage)   Restrictions: Home today, Disabled until next office visit  Follow-up in about 3 days (around 7/23/2018).

## 2018-07-20 NOTE — LETTER
[facility] [place] [address]  [place] [address]  Phone: [phone]  Fax: [phone]    Pt Name: Bryn Camarillo Jr.  Injury Date: 07/20/2018   Employee ID:  Date of First Treatment: 07/20/2018   Company: [employer]            Appointment Time: 02:05 PM Arrived:  2:20 PM CDT   Appointment Date: [unfilled] Time Out:1630 PM    Physician: Isis Huerta NP        Office Treatment: Bryn was seen today for arm injury.    Diagnoses and all orders for this visit:  EXAM   DRUG SCREEN  ALCOHOL SCREEN  LACERATION REPAIR  WOUND DRESSING  DISABLED UNTIL NEXT OFFICE VISIT        Encounter for drug screening  Laceration of left forearm, initial encounter  -     doxycycline (VIBRAMYCIN) 100 MG Cap; Take 1 capsule (100 mg total) by mouth 2 (two) times daily. for 10 days  Work related injury       Patient Instructions: Attention not to aggravate affected area, Apply ice 24-48 hours then apply heat/warm soaks, Keep dressing clean/dry/covered (no not get wound wet.  Wound checks daily monitor for signs of infection: area becomes more red, warm, more painful, drainage)    Restrictions: Home today, Disabled until next office visit       Return Appointment: 7/23/18 @ 0930 [facility]

## 2018-07-20 NOTE — TELEPHONE ENCOUNTER
----- Message from Ni Cruz sent at 7/20/2018 12:09 PM CDT -----  Contact: Wife - Sara  Patient is having reaction from one of his medications he does not know which one but he has a bad taste in his mouth and constipated. Please call to advise at [phone]    amoxicillin (AMOXIL) 500 MG Tab  clarithromycin (BIAXIN) 500 MG tablet  omeprazole (PRILOSEC) 20 MG capsule      [id number] - LELIA JOHNSON  [address] AT [facility] [address]

## 2018-07-20 NOTE — PATIENT INSTRUCTIONS
Extremity Laceration: Sutures, Staples, or Tape  A laceration is a cut through the skin. If it is deep, it may require stitches (sutures) or staples to close so it can heal. Minor cuts may be treated with surgical tape closures.   X-rays may be done if something may have entered the skin through the cut. You may also need a tetanus shot if you are not up to date on this vaccination.  Home care  · Follow the health care providers instructions on how to care for the cut.  · Wash your hands with soap and warm water before and after caring for your wound. This is to help prevent infection.  · Keep the wound clean and dry. If a bandage was applied and it becomes wet or dirty, replace it. Otherwise, leave it in place for the first 24 hours, then change it once a day or as directed.  · If sutures or staples were used, clean the wound daily:  · After removing the bandage, wash the area with soap and water. Use a wet cotton swab to loosen and remove any blood or crust that forms.  · After cleaning, keep the wound clean and dry. Talk with your doctor before applying any antibiotic ointment to the wound. Reapply the bandage.  · You may remove the bandage to shower as usual after the first 24 hours, but do not soak the area in water (no swimming) until the stitches or staples are removed.  · If surgical tape closures were used, keep the area clean and dry. If it becomes wet, blot it dry with a towel.  · The doctor may prescribe an antibiotic cream or ointment to prevent infection. Do not stop taking this medication until you have finished the prescribed course or the doctor tells you to stop. The doctor may also prescribe medications for pain. Follow the doctors instructions for taking these medications.  · Avoid activities that may reopen your wound.  Follow-up care  Follow up with your health care provider. Most skin wounds heal within ten days. However, an infection may sometimes occur despite proper treatment.  Therefore, check the wound daily for the signs of infection listed below. Stitches and staples should be removed within 7-14 days. If surgical tape closures were used, you may remove them after 10 days if they have not fallen off by then.   When to seek medical advice  Call your health care provider right away if any of these occur:  · Wound bleeding not controlled by direct pressure  · Signs of infection, including increasing pain in the wound, increasing wound redness or swelling, or pus or bad odor coming from the wound  · Fever of 100.4°F (38ºC) or higher or as directed by your healthcare provider  · Stitches or staples come apart or fall out or surgical tape falls off before 7 days  · Wound edges re-open  · Wound changes colors  · Numbness around the wound   · Decreased movement around the injured area  Date Last Reviewed: 6/14/2015  © 3116-5825 [employer]. [address] [place] [address], [place] [address]. All rights reserved. This information is not intended as a substitute for professional medical care. Always follow your healthcare professional's instructions.

## 2018-07-20 NOTE — TELEPHONE ENCOUNTER
Dr Dumont, see message from pt. I left a message for him to return my call. I asked him to call and speak to pharmacist about side affects, and to try OTC Miralax for constipation. Is there anything else I need to discuss with pt.

## 2018-07-20 NOTE — TELEPHONE ENCOUNTER
Spoke with wife as patient was at work. Advised to increase water. Chew on a piece of gum throughout the day, and to take Colace at night.   (2) good, crying

## 2018-07-23 ENCOUNTER — OFFICE VISIT (OUTPATIENT)
Dept: URGENT CARE | Facility: CLINIC | Age: 53
End: 2018-07-23
Payer: COMMERCIAL
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
## 2018-07-23 DIAGNOSIS — S51.812D LACERATION OF LEFT FOREARM, SUBSEQUENT ENCOUNTER: Primary | ICD-10-CM

## 2018-07-23 DIAGNOSIS — B18.2 CHRONIC HEPATITIS C WITHOUT HEPATIC COMA: Primary | ICD-10-CM

## 2018-07-23 PROCEDURE — 99213 OFFICE O/P EST LOW 20 MIN: CPT | Mod: 25,S$GLB,, | Performed by: NURSE PRACTITIONER

## 2018-07-23 NOTE — PROGRESS NOTES
Subjective:       Patient ID: Bryn Camarillo Jr. is a 52 y.o. male.    Chief Complaint: Arm Injury (Laceration Left Forearm)    Patient returns today for a follow-up of a laceration to his left forearm. He states that he is having 0/10 pain and is feeling good. RL  DOI: 7/20/18       Arm Injury    Incident onset: 7/20/18. The incident occurred at work. The pain is at a severity of 0/10. The patient is experiencing no pain. The pain has been improving since the incident. Pertinent negatives include no chest pain or numbness. The symptoms are aggravated by palpation.     Review of Systems   Constitution: Negative for chills, fever, weakness and malaise/fatigue.   HENT: Negative for nosebleeds.    Cardiovascular: Negative for chest pain and syncope.   Respiratory: Negative for cough, shortness of breath and wheezing.    Skin: Positive for itching. Negative for color change, dry skin and poor wound healing.        Laceration left FA   Musculoskeletal: Negative for back pain, joint pain and neck pain.   Gastrointestinal: Negative for abdominal pain.   Genitourinary: Negative for hematuria.   Neurological: Negative for dizziness, numbness and sensory change.   Psychiatric/Behavioral: Negative for altered mental status and depression. The patient does not have insomnia.        Objective:      Physical Exam   Constitutional: He is oriented to person, place, and time. He appears well-developed and well-nourished.   Neck: Normal range of motion. Neck supple.   Cardiovascular: Normal rate and regular rhythm.    Pulmonary/Chest: Effort normal and breath sounds normal.   Abdominal: Soft. Bowel sounds are normal.   Musculoskeletal: Normal range of motion. He exhibits no tenderness.        Left shoulder: Normal.        Left elbow: Normal.        Left wrist: Normal.        Left upper arm: Normal.        Left forearm: He exhibits laceration. He exhibits no tenderness, no swelling and no edema.   Neurological: He is alert and  oriented to person, place, and time. He has normal strength. No sensory deficit. GCS eye subscore is 4. GCS verbal subscore is 5. GCS motor subscore is 6.   Reflex Scores:       Bicep reflexes are 2+ on the right side and 2+ on the left side.       Brachioradialis reflexes are 2+ on the right side and 2+ on the left side.  Skin: Skin is warm and dry. Laceration noted. No erythema. No pallor.            Assessment:       1. Laceration of left forearm, subsequent encounter    2. Work related injury        Plan:       Bryn was seen today for arm injury.    Diagnoses and all orders for this visit:    Laceration of left forearm, subsequent encounter    Work related injury       Plan to remove sutures Monday 07/30/2018  Patient Instructions: Keep dressing clean/dry/covered, Attention not to aggravate affected area (Return to [place]. Do not get wound wet. Okay to have wound open to air at home only for a few hours after work. Dressing at night. Monitor for signs of infection ( increased pain, discharge, warmth at wound site.  Continue antibiotic as directed )   Restrictions: No lifting/pushing/pulling more than 10 lbs, Limited use of left hand and arm, No above the shoulder/overhead work (Must clean wound clean and dry.  Avoid dirty work environments .  Wound to be covered while at work.)  Follow-up in about 3 days (around 7/26/2018), or if symptoms worsen or fail to improve.

## 2018-07-23 NOTE — PATIENT INSTRUCTIONS
Extremity Laceration: Sutures, Staples, or Tape  A laceration is a cut through the skin. If it is deep, it may require stitches (sutures) or staples to close so it can heal. Minor cuts may be treated with surgical tape closures.   X-rays may be done if something may have entered the skin through the cut. You may also need a tetanus shot if you are not up to date on this vaccination.  Home care  · Follow the health care providers instructions on how to care for the cut.  · Wash your hands with soap and warm water before and after caring for your wound. This is to help prevent infection.  · Keep the wound clean and dry. If a bandage was applied and it becomes wet or dirty, replace it. Otherwise, leave it in place for the first 24 hours, then change it once a day or as directed.  · If sutures or staples were used, clean the wound daily:  · After removing the bandage, wash the area with soap and water. Use a wet cotton swab to loosen and remove any blood or crust that forms.  · After cleaning, keep the wound clean and dry. Talk with your doctor before applying any antibiotic ointment to the wound. Reapply the bandage.  · You may remove the bandage to shower as usual after the first 24 hours, but do not soak the area in water (no swimming) until the stitches or staples are removed.  · If surgical tape closures were used, keep the area clean and dry. If it becomes wet, blot it dry with a towel.  · The doctor may prescribe an antibiotic cream or ointment to prevent infection. Do not stop taking this medication until you have finished the prescribed course or the doctor tells you to stop. The doctor may also prescribe medications for pain. Follow the doctors instructions for taking these medications.  · Avoid activities that may reopen your wound.  Follow-up care  Follow up with your health care provider. Most skin wounds heal within ten days. However, an infection may sometimes occur despite proper treatment.  Therefore, check the wound daily for the signs of infection listed below. Stitches and staples should be removed within 7-14 days. If surgical tape closures were used, you may remove them after 10 days if they have not fallen off by then.   When to seek medical advice  Call your health care provider right away if any of these occur:  · Wound bleeding not controlled by direct pressure  · Signs of infection, including increasing pain in the wound, increasing wound redness or swelling, or pus or bad odor coming from the wound  · Fever of 100.4°F (38ºC) or higher or as directed by your healthcare provider  · Stitches or staples come apart or fall out or surgical tape falls off before 7 days  · Wound edges re-open  · Wound changes colors  · Numbness around the wound   · Decreased movement around the injured area  Date Last Reviewed: 6/14/2015  © 7814-1348 [employer]. [address] [place] [address]. All rights reserved. This information is not intended as a substitute for professional medical care. Always follow your healthcare professional's instructions.        Wound Check, No Infection  Your wound is healing as expected. There are no signs of infection.   Home care  Continue to care for your wound as directed.  · Cover your wound with a bandage unless your healthcare provider tells you not to.  · Gently clean your wound with soap and water when you shower.   · Unless told otherwise, avoid swimming and taking tub baths until your wound has healed.  Follow-up care  Follow up with your healthcare provider as advised.  · If you have sutures or staples, return as directed to have them removed. If they are not taken out on time, they may be harder to remove and scarring may be worse. Infection may develop.  · If surgical tape strips were used, you can remove them yourself if they have not fallen off by 10 days after they were applied.   When to seek medical advice  Call your healthcare provider  right away if any of these occur:  · Fever of 100.4ºF (38ºC) or higher, or as directed by your health care provider  · Symptoms of a wound infection, including:  ¨ Redness or swelling around the wound  ¨ Warmth coming from the wound  ¨ New or worsening pain  ¨ Red streaks around the wound  ¨ Draining pus  Date Last Reviewed: 8/24/2015  © 5832-0477 [employer]. [address] [place] [address], [place] [address]. All rights reserved. This information is not intended as a substitute for professional medical care. Always follow your healthcare professional's instructions.

## 2018-07-24 ENCOUNTER — TELEPHONE (OUTPATIENT)
Dept: PHARMACY | Facility: CLINIC | Age: 53
End: 2018-07-24

## 2018-07-26 ENCOUNTER — OFFICE VISIT (OUTPATIENT)
Dept: URGENT CARE | Facility: CLINIC | Age: 53
End: 2018-07-26
Payer: COMMERCIAL

## 2018-07-26 DIAGNOSIS — S51.812D LACERATION OF LEFT FOREARM, SUBSEQUENT ENCOUNTER: Primary | ICD-10-CM
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
## 2018-07-26 PROCEDURE — 99212 OFFICE O/P EST SF 10 MIN: CPT | Mod: S$GLB,,, | Performed by: NURSE PRACTITIONER

## 2018-07-26 NOTE — PATIENT INSTRUCTIONS
Wound Check, No Infection  Your wound is healing as expected. There are no signs of infection.   Home care  Continue to care for your wound as directed.  · Cover your wound with a bandage unless your healthcare provider tells you not to.  · Gently clean your wound with soap and water when you shower.   · Unless told otherwise, avoid swimming and taking tub baths until your wound has healed.  Follow-up care  Follow up with your healthcare provider as advised.  · If you have sutures or staples, return as directed to have them removed. If they are not taken out on time, they may be harder to remove and scarring may be worse. Infection may develop.  · If surgical tape strips were used, you can remove them yourself if they have not fallen off by 10 days after they were applied.   When to seek medical advice  Call your healthcare provider right away if any of these occur:  · Fever of 100.4ºF (38ºC) or higher, or as directed by your health care provider  · Symptoms of a wound infection, including:  ¨ Redness or swelling around the wound  ¨ Warmth coming from the wound  ¨ New or worsening pain  ¨ Red streaks around the wound  ¨ Draining pus  Date Last Reviewed: 8/24/2015  © 4730-7451 [employer]. [address] [place] [address], [place] [address]. All rights reserved. This information is not intended as a substitute for professional medical care. Always follow your healthcare professional's instructions.

## 2018-07-26 NOTE — LETTER
[facility] [place]  [address], [address]  [place] [facility] [address]  Phone: [phone]  Fax: [phone]    Pt Name: Bryn Camarillo Jr.  Injury Date: 07/20/2018   Employee ID: [id number] Date: 07/26/2018   Company: [employer]            Appointment Time: 11:00 AM Arrived: 9:14 AM CDT   Physician: Isis Huerta NP Time out: 9:47 AM       Office Treatment: Bryn was seen today for laceration.    Diagnoses and all orders for this visit:    Laceration of left forearm, subsequent encounter    Work related injury       Patient Instructions: Attention not to aggravate affected area, Keep dressing clean/dry/covered, Elevated affected area (okay to keep wound open to air at home after work.  do not wet wound.  Continue your Doxycycline )      Restrictions: No lifting/pushing/pulling more than 25 lbs, No above the shoulder/overhead work, Limited use of left hand and arm (Dressing to left arm while at work.  Must keep dressing clean and dry)       Return Appointment: 7/30/2018 at 11:00 AM

## 2018-07-30 ENCOUNTER — OFFICE VISIT (OUTPATIENT)
Dept: URGENT CARE | Facility: CLINIC | Age: 53
End: 2018-07-30
Payer: COMMERCIAL
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
## 2018-07-30 DIAGNOSIS — Z48.02 ENCOUNTER FOR REMOVAL OF SUTURES: ICD-10-CM

## 2018-07-30 DIAGNOSIS — S51.812D LACERATION OF LEFT FOREARM, SUBSEQUENT ENCOUNTER: Primary | ICD-10-CM

## 2018-07-30 PROCEDURE — 99213 OFFICE O/P EST LOW 20 MIN: CPT | Mod: 25,S$GLB,, | Performed by: NURSE PRACTITIONER

## 2018-07-30 NOTE — LETTER
[facility] [place]  [address], [address]  [place] [facility] [address]  Phone: [phone]  Fax: [phone]    Pt Name: Bryn Camarillo Jr.  Injury Date: 07/20/2018   Employee ID: [id number] Date: 07/30/2018   Company: [employer]            Appointment Time: 10:00 AM Arrived: 9:10 AM CDT   Physician: Isis Huerta NP Time out: 10:11 AM       Office Treatment: Bryn was seen today for laceration.    Diagnoses and all orders for this visit:    Laceration of left forearm, subsequent encounter    Work related injury    Encounter for removal of sutures       Patient Instructions: Keep dressing clean/dry/covered (MAKE SURE YOU FINISH ALL OF YOUR DOXYCYCLINE AS DIRECTED.  WHILE AT WORK CONTINUE TO KEEP WOUND PROTECTED)    Restrictions: NONE  Regular Duty  Patient is discharged from Occupational Health care.

## 2018-07-30 NOTE — PROGRESS NOTES
ubjective:       Patient ID: Bryn Camarillo Jr. is a 52 y.o. male.    Chief Complaint: Laceration (left forearm)    F/u for laceration left forearm (7/20/2018). Denies pain or discomfort. Pt reports a few of his sutures came out while changing his dressing. DENIES FEVER AND OR CHILLS.       Laceration    The incident occurred more than 1 week ago. The laceration is located on the left arm. The patient is experiencing no pain. He reports no foreign bodies present. His tetanus status is UTD.     Review of Systems   Constitution: Negative. Negative for chills and fever.   HENT: Negative for sore throat.    Eyes: Negative.  Negative for blurred vision and redness.   Cardiovascular: Negative for chest pain and dyspnea on exertion.   Respiratory: Negative.  Negative for cough and shortness of breath.    Skin: Negative for color change, poor wound healing and rash.   Musculoskeletal: Negative for back pain, joint pain, muscle weakness and stiffness.   Gastrointestinal: Negative for abdominal pain, diarrhea, heartburn, nausea and vomiting.   Genitourinary: Negative for bladder incontinence and frequency.   Neurological: Negative for headaches, numbness and sensory change.   Psychiatric/Behavioral: The patient is not nervous/anxious.        Objective:      Physical Exam   Constitutional: He is oriented to person, place, and time. He appears well-developed and well-nourished.   Neck: Normal range of motion. Neck supple.   Cardiovascular: Normal rate and regular rhythm.    Pulmonary/Chest: Effort normal and breath sounds normal.   Abdominal: Soft. Bowel sounds are normal.   Musculoskeletal: Normal range of motion. He exhibits no edema or tenderness.   Neurological: He is alert and oriented to person, place, and time. He displays normal reflexes. No cranial nerve deficit or sensory deficit. He exhibits normal muscle tone. Coordination normal.   Skin: Skin is warm and dry. Capillary refill takes 2 to 3 seconds. Laceration  noted. No rash noted. No erythema. No pallor.        Psychiatric: He has a normal mood and affect. His behavior is normal.       Assessment:       1. Laceration of left forearm, subsequent encounter    2. Work related injury    3. Encounter for removal of sutures        Plan:       Bryn was seen today for laceration.    Diagnoses and all orders for this visit:    Laceration of left forearm, subsequent encounter    Work related injury    Encounter for removal of sutures       DISCUSSED SMOKING CESSATION AND LITERATURE GIVEN  Patient Instructions: Keep dressing clean/dry/covered (MAKE SURE YOU FINISH ALL OF YOUR DOXYCYCLINE AS DIRECTED.  WHILE AT WORK CONTINUE TO KEEP WOUND PROTECTED)   Restrictions: Regular Duty  Follow-up if symptoms worsen or fail to improve.

## 2018-07-30 NOTE — PATIENT INSTRUCTIONS
Wound Check, No Infection  Your wound is healing as expected. There are no signs of infection.   Home care  Continue to care for your wound as directed.  · Cover your wound with a bandage unless your healthcare provider tells you not to.  · Gently clean your wound with soap and water when you shower.   · Unless told otherwise, avoid swimming and taking tub baths until your wound has healed.  Follow-up care  Follow up with your healthcare provider as advised.  · If you have sutures or staples, return as directed to have them removed. If they are not taken out on time, they may be harder to remove and scarring may be worse. Infection may develop.  · If surgical tape strips were used, you can remove them yourself if they have not fallen off by 10 days after they were applied.   When to seek medical advice  Call your healthcare provider right away if any of these occur:  · Fever of 100.4ºF (38ºC) or higher, or as directed by your health care provider  · Symptoms of a wound infection, including:  ¨ Redness or swelling around the wound  ¨ Warmth coming from the wound  ¨ New or worsening pain  ¨ Red streaks around the wound  ¨ Draining pus  Date Last Reviewed: 8/24/2015  © 2802-7699 [employer]. [address] [place] [address], [place] [address]. All rights reserved. This information is not intended as a substitute for professional medical care. Always follow your healthcare professional's instructions.

## 2018-07-30 NOTE — PROCEDURES
Procedures : SUTURE REMOVAL- 7 SUTURES REMOVED . 2 FELL OUT PRIOR TO THIS VISIT. TOLERATED WELL. 2 STERI-STRIPS APPLIED AND DRESSING SECURED.

## 2018-08-01 RX ORDER — LEDIPASVIR AND SOFOSBUVIR 90; 400 MG/1; MG/1
1 TABLET, FILM COATED ORAL DAILY
Qty: 28 TABLET | Refills: 1 | Status: SHIPPED | OUTPATIENT
Start: 2018-08-01 | End: 2018-08-21 | Stop reason: SDUPTHER

## 2018-08-02 ENCOUNTER — TELEPHONE (OUTPATIENT)
Dept: PHARMACY | Facility: CLINIC | Age: 53
End: 2018-08-02

## 2018-08-02 NOTE — TELEPHONE ENCOUNTER
Notified the patient we received the prescription for Harvoni, and it will require authorization from the insurance company. We will continue to follow up.

## 2018-08-07 ENCOUNTER — TELEPHONE (OUTPATIENT)
Dept: FAMILY MEDICINE | Facility: CLINIC | Age: 53
End: 2018-08-07

## 2018-08-07 NOTE — TELEPHONE ENCOUNTER
DOCUMENTATION ONLY:  Prior authorization for Sita approved from 08/06/2018 to 10/01/2018 x 8 weeks of treatment.   Case ID# [id number]    Co-pay: $unknown    Preferred Specialty Pharmacy:   [facility]   T: [phone]  F: [phone]    Sita co-pay coupon card information:   BIN: [id number]  RxPCN: [id number]  Issuer: ([id number]  Group Number: [id number]  ID#: [id number]

## 2018-08-07 NOTE — TELEPHONE ENCOUNTER
"----- Message from Yosvany Burns sent at 8/7/2018  8:22 AM CDT -----  Regarding: Harvoni 90/400mg x 8 weeks - [facility]   KUSH Clements: Harvoni 90/400mg prior authorization has been approved through 10/01/2018 x 8 weeks. Patients insurance requires the patient to fill through [facility]. Please send prescription to [facility], which has been added to patient EPIC profile. Patient has not been notified and provided with the necessary information to call and schedule a delivery (LVM).     To complete this in EPIC, the original order MUST be discontinued and re-typed as a new prescription with the updated pharmacy listed. Clicking "reorder" will continue to route the prescription to [facility] even if the pharmacy is changed. Please OPT the patient out of [facility] when the BPA is fired.    Thanks,  Yosvany Burns  [phone]  "

## 2018-08-08 ENCOUNTER — TELEPHONE (OUTPATIENT)
Dept: PHARMACY | Facility: CLINIC | Age: 53
End: 2018-08-08

## 2018-08-08 NOTE — TELEPHONE ENCOUNTER
Patient called to f/u on transition to [facility] for Harvoni - patient provided contact info for [facility] pharmacy and instructed to allow 24 hours for them to process his information and call for update.      Patient aware that if he does not get any direct information from [facility] regarding having an rx for him or an ability to proceed, he is to call [facility].  Will f/u with patient in approx 72 hours to determine progress.     ELIN Reynolds.Ph.  Clinical Pharmacist  [facility]  Phone: [phone]

## 2018-08-13 NOTE — TELEPHONE ENCOUNTER
Patient called to f/u on transition to [facility] - na lvm.     ELIN Reynolds.Ph.  Clinical Pharmacist  [facility]  Phone: [phone]

## 2018-08-15 ENCOUNTER — HOSPITAL ENCOUNTER (OUTPATIENT)
Dept: RADIOLOGY | Facility: HOSPITAL | Age: 53
Discharge: HOME | End: 2018-08-15
Attending: FAMILY MEDICINE
Payer: COMMERCIAL

## 2018-08-15 DIAGNOSIS — B18.2 CHRONIC HEPATITIS C WITHOUT HEPATIC COMA: ICD-10-CM

## 2018-08-15 PROCEDURE — 76705 ECHO EXAM OF ABDOMEN: CPT | Mod: TC

## 2018-08-15 PROCEDURE — 76705 ECHO EXAM OF ABDOMEN: CPT | Mod: 26,,, | Performed by: RADIOLOGY

## 2018-08-21 ENCOUNTER — TELEPHONE (OUTPATIENT)
Dept: FAMILY MEDICINE | Facility: CLINIC | Age: 53
End: 2018-08-21

## 2018-08-21 DIAGNOSIS — B18.2 CHRONIC HEPATITIS C WITHOUT HEPATIC COMA: Primary | ICD-10-CM

## 2018-08-21 RX ORDER — LEDIPASVIR AND SOFOSBUVIR 90; 400 MG/1; MG/1
1 TABLET, FILM COATED ORAL DAILY
Qty: 28 TABLET | Refills: 1 | Status: SHIPPED | OUTPATIENT
Start: 2018-08-21 | End: 2018-09-18

## 2018-08-21 NOTE — TELEPHONE ENCOUNTER
Please let patient know that he was approved for the hepatitis-C medication, however his insurance requires that he feels it out at and mail order pharmacy- [facility].  I sent in the prescription there, and he needs to call to arrange for delivery.  Once he calls them they will arrange for delivery.  Please stressed to the patient that he should not start the medicine until he sees me in the office again. Once, he has a delivery date, he should call to set up an appointment with me for after that delivery date so we can go over the correct way of taking the medication and possible interactions.  Again do not take the medicine no matter what the representative on the phone tells them because they are going to tell him to start the medicine as soon as he gets it.  It is extremely important that he follows these directions to give him the best opportunity of getting cured, as many of these medications interact with a lot of prescribed and over-the-counter medications

## 2018-08-29 ENCOUNTER — TELEPHONE (OUTPATIENT)
Dept: FAMILY MEDICINE | Facility: CLINIC | Age: 53
End: 2018-08-29

## 2018-08-29 NOTE — TELEPHONE ENCOUNTER
----- Message from Audra Chavez sent at 8/29/2018  3:01 PM CDT -----  Contact: Pt's Wife   Pt received his meds but has questions on how to take them. Please call [phone]

## 2018-08-29 NOTE — TELEPHONE ENCOUNTER
Patient informed to NOT Take this medication until he receives correct patient education on the new med and expressed understanding and agreed to make appointment for 9/6/18

## 2018-09-06 ENCOUNTER — OFFICE VISIT (OUTPATIENT)
Dept: FAMILY MEDICINE | Facility: CLINIC | Age: 53
End: 2018-09-06
Payer: COMMERCIAL

## 2018-09-06 VITALS
OXYGEN SATURATION: 99 % | DIASTOLIC BLOOD PRESSURE: 88 MMHG | RESPIRATION RATE: 20 BRPM | BODY MASS INDEX: 26.95 KG/M2 | TEMPERATURE: 98 F | HEIGHT: 64 IN | SYSTOLIC BLOOD PRESSURE: 130 MMHG | HEART RATE: 71 BPM | WEIGHT: 157.88 LBS

## 2018-09-06 DIAGNOSIS — F17.200 TOBACCO DEPENDENCE: ICD-10-CM

## 2018-09-06 DIAGNOSIS — F10.20 UNCOMPLICATED ALCOHOL DEPENDENCE: ICD-10-CM

## 2018-09-06 DIAGNOSIS — K82.4 GALLBLADDER POLYP: ICD-10-CM

## 2018-09-06 DIAGNOSIS — B18.2 CHRONIC HEPATITIS C WITHOUT HEPATIC COMA: Primary | ICD-10-CM

## 2018-09-06 DIAGNOSIS — A04.8 H. PYLORI INFECTION: ICD-10-CM

## 2018-09-06 DIAGNOSIS — Z23 NEED FOR VACCINATION: ICD-10-CM

## 2018-09-06 PROCEDURE — 99214 OFFICE O/P EST MOD 30 MIN: CPT | Mod: S$GLB,,, | Performed by: FAMILY MEDICINE

## 2018-09-06 PROCEDURE — 99999 PR PBB SHADOW E&M-EST. PATIENT-LVL IV: CPT | Mod: PBBFAC,,, | Performed by: FAMILY MEDICINE

## 2018-09-07 NOTE — PROGRESS NOTES
Routine Office Visit    Patient Name: Bryn Camarillo Jr.    : 1965  MRN: [id number]    Subjective:  Bryn is a 52 y.o. male who presents today for:   Chief Complaint   Patient presents with    Hepatitis C        52-year-old male comes in to initiate hepatitis-C treatment.  He has received his prescription for Harvoni,  And has not started as he was instructed to wait until he saw me 1st.    His labs from 2018 shows that he has a genotype 1a and had a viral load of 593,651.  His FibroSure score was F0 on 18.  Hepatitis-B testing showed negative surface antigen, negative surface antibody, the positive core antibody.  Subsequent hepatitis-B viral DNA showed no virus detected.    The patient subsequently had an ultrasound done of the right upper quadrant. The liver had normal echotexture and dimension.  The sonogram was positive for a 4 mm gallbladder polyp versus nonmobile calculus.  Since his last visit, the patient has completed H pylori treatment. He is due for test of cure.   also since last visit, the patient has dramatically decrease his alcohol.  He states that he no longer drinks alcohol during the week, and drinks only a few beers on the weekend but states that this weekend will be the last weekend he has any as he plans on starting treatment on Monday.      Past Medical History  Past Medical History:   Diagnosis Date    Alcohol abuse     Tobacco abuse        Past Surgical History  Past Surgical History:   Procedure Laterality Date    NO PAST SURGERIES          Family History  Family History   Problem Relation Age of Onset    Hypertension Father     Stroke Father     Alcohol abuse Mother        Social History  Social History     Socioeconomic History    Marital status:      Spouse name: Not on file    Number of children: Not on file    Years of education: Not on file    Highest education level: Not on file   Social Needs    Financial resource strain: Not on file     Food insecurity - worry: Not on file    Food insecurity - inability: Not on file    Transportation needs - medical: Not on file    Transportation needs - non-medical: Not on file   Occupational History    Not on file   Tobacco Use    Smoking status: Current Every Day Smoker     Packs/day: 1.00     Years: 25.00     Pack years: 25.00   Substance and Sexual Activity    Alcohol use: Yes     Comment: 140 oz/week    Drug use: No    Sexual activity: Yes     Partners: Female   Other Topics Concern    Not on file   Social History Narrative    Not on file       Current Medications  Current Outpatient Medications on File Prior to Visit   Medication Sig Dispense Refill    ledipasvir-sofosbuvir (HARVONI)  mg Tab Take 1 tablet by mouth once daily. 28 tablet 1    [DISCONTINUED] folic acid (FOLVITE) 1 MG tablet Take 1 tablet (1 mg total) by mouth once daily.  0    [DISCONTINUED] omeprazole (PRILOSEC) 20 MG capsule Take 1 capsule (20 mg total) by mouth 2 (two) times daily. for 14 days 28 capsule 0     No current facility-administered medications on file prior to visit.        Allergies   Review of patient's allergies indicates:  No Known Allergies    Review of Systems   Constitutional: Negative for unexpected weight change.   HENT: Negative for ear pain and sore throat.    Eyes: Negative for visual disturbance.   Respiratory: Negative for shortness of breath.    Cardiovascular: Negative for chest pain.   Gastrointestinal: Negative for abdominal pain and blood in stool.   Endocrine: Negative for cold intolerance and heat intolerance.   Genitourinary: Negative for dysuria and frequency.   Skin: Negative for rash.   Neurological: Negative for weakness, numbness and headaches.   Hematological: Negative for adenopathy.   Psychiatric/Behavioral: Negative for suicidal ideas.         /88 (BP Location: Left arm, Patient Position: Sitting, BP Method: Medium (Manual))   Pulse 71   Temp 97.6 °F (36.4 °C) (Oral)    "Resp 20   Ht 5' 4" (1.626 m)   Wt 71.6 kg (157 lb 13.6 oz)   SpO2 99%   BMI 27.09 kg/m²     Physical Exam   Constitutional: He appears well-developed and well-nourished.   HENT:   Head: Normocephalic.   Right Ear: External ear normal.   Left Ear: External ear normal.   Nose: Nose normal.   Mouth/Throat: No oropharyngeal exudate.   Neck: Normal range of motion. Neck supple. No tracheal deviation present.   Cardiovascular: Normal rate, regular rhythm, normal heart sounds and intact distal pulses.   No murmur heard.  Pulmonary/Chest: Effort normal and breath sounds normal. He has no wheezes. He has no rales.   Abdominal: Soft. Bowel sounds are normal. He exhibits no mass. There is no tenderness.   Musculoskeletal: He exhibits no edema.   Lymphadenopathy:     He has no cervical adenopathy.   Skin: He is not diaphoretic.   Vitals reviewed.      Assessment/Plan:  Bryn was seen today for hepatitis c.    Diagnoses and all orders for this visit:    Chronic hepatitis C without hepatic coma  -     Comprehensive metabolic panel; Future  -     CBC auto differential; Future  -     Comprehensive metabolic panel; Future  -     Hepatitis C RNA, quantitative, PCR; Future  -     Comprehensive metabolic panel; Future  -     Hepatitis C RNA, quantitative, PCR; Future  HCV Regimen: Harvoni; Length of treatment: 8 weeks as he is genotype 1a, he is treatment naive, non-cirrhotic, nonblack, and VL is <6 million  HCV treatment start date: 9/10/18, End of Week 4: 10/7/18; End of Week 8: 11/4/18; SVR12: 1/27/19     I discussed with the patient the importance of keeping the medicine in a secure location and to not allow access to anyone else.  Discussed with the patient that due to the cost of the medication if he were to lose hit or miss place it he would be extremely unlikely that the insurance company would replace.  We discussed possible side effects and if he has any he will call my medical assistant before he takes any " over-the-counter medicines.  I discussed with him that many over-the-counter medicines can interact with this,  As well as many prescribed medicines.  His white it is important that he contact me 1st so I can let him know if there is an interaction. I explained to him that most providers are not hepatitis-C providers and do not know the potential interactions with the medicine.    I did explain to him that if he were to have headache or joint pains it is okay to take Tylenol or ibuprofen as these do not interact.    If he has any abdominal pain, prior to starting any medicines he is to call me 1st.    The patient will do labs in 2 weeks and I will call him with those results.  This to make sure he is not having any elevated liver the medication.  He will then repeat labs at the end of 8 weeks and follow up in the office.    H. pylori infection  -     H. pylori antigen, stool; Future    Discussed with the patient that test of cure cannot be done in the blood as this will always be positive.  As such stool test ordered.      Gallbladder polyp  -     Ambulatory referral to Gastroenterology  Ultrasound consistent with likely gallbladder polyp which is less than 1 cm.  Discussed with the patient that these are usually benign however it is extremely important that he follow up with Gastroenterology for an evaluation.  I discussed with him that he may also need a surgical evaluation.      Need for vaccination  -     (In Office Administered) Hepatitis B Vaccine (Adult) (IM)  Patient will need to complete hepatitis B vaccination series    Uncomplicated alcohol dependence   patient congratulated on decreasing alcohol intake and efforts to stop drinking.  He reports no seizure-like activity or hallucinations when he decreased his intake.    Tobacco dependence    Importance of a smoking cessation discussed.  Patient not ready to quit smoking yet.        This office note has been dictated.  This dictation has been generated  using M-Modal Fluency Direct dictation; some phonetic errors may occur.

## 2018-09-10 ENCOUNTER — TELEPHONE (OUTPATIENT)
Dept: FAMILY MEDICINE | Facility: CLINIC | Age: 53
End: 2018-09-10

## 2018-09-10 NOTE — TELEPHONE ENCOUNTER
----- Message from Leslie Mendoza MA sent at 9/10/2018  5:29 PM CDT -----  Contact: Self      ----- Message -----  From: Marifer Marquez  Sent: 9/10/2018  11:08 AM  To: [facility] Staff    Pt is calling to speak with staff regarding his medication. Please call pt at [phone].

## 2018-09-10 NOTE — TELEPHONE ENCOUNTER
Patient wanted to make sure if he could drink orange juice with his medication. Informed him that yes.

## 2018-09-10 NOTE — TELEPHONE ENCOUNTER
----- Message from Marifer Marquez sent at 9/10/2018 11:08 AM CDT -----  Contact: Self  Pt is calling to speak with staff regarding his medication. Please call pt at [phone].

## 2018-09-12 ENCOUNTER — TELEPHONE (OUTPATIENT)
Dept: FAMILY MEDICINE | Facility: CLINIC | Age: 53
End: 2018-09-12

## 2018-09-12 ENCOUNTER — CLINICAL SUPPORT (OUTPATIENT)
Dept: FAMILY MEDICINE | Facility: CLINIC | Age: 53
End: 2018-09-12
Payer: COMMERCIAL

## 2018-09-12 ENCOUNTER — TELEPHONE (OUTPATIENT)
Dept: ADMINISTRATIVE | Facility: HOSPITAL | Age: 53
End: 2018-09-12

## 2018-09-12 DIAGNOSIS — Z23 NEED FOR HEPATITIS B VACCINATION: Primary | ICD-10-CM

## 2018-09-12 PROCEDURE — 90471 IMMUNIZATION ADMIN: CPT | Mod: S$GLB,,, | Performed by: FAMILY MEDICINE

## 2018-09-12 PROCEDURE — 90746 HEPB VACCINE 3 DOSE ADULT IM: CPT | Mod: S$GLB,,, | Performed by: FAMILY MEDICINE

## 2018-09-12 NOTE — TELEPHONE ENCOUNTER
----- Message from Yvonne Austin MA sent at 9/12/2018  2:02 PM CDT -----  Contact: self      ----- Message -----  From: Mateusz Gaines  Sent: 9/12/2018  12:28 PM  To: [facility] Staff    Pt devin he missed this morning's dosage of  ledipasvir-sofosbuvir (HARVONI)  mg Tab. Would like to know if he can take it tonight. Contact [phone].

## 2018-09-12 NOTE — TELEPHONE ENCOUNTER
I called the patient, and informed it is taking soon as he gets home.  He is to resume is number scheduled tomorrow morning

## 2018-09-12 NOTE — PROGRESS NOTES
(8:45 AM) - First dose of Hep B was given IM in the left deltoid. Tolerated well. Unable to remain in the clinic after injection. Had to go to  his grand kids. Will call the office to schedule second dose.

## 2018-09-12 NOTE — TELEPHONE ENCOUNTER
Contacted patient for GI referral. Patient stated he will call back to get Lamsa THOMAS's phone number when he can schedule the appt. "[employer]" can be reached at [phone].

## 2018-09-13 ENCOUNTER — TELEPHONE (OUTPATIENT)
Dept: FAMILY MEDICINE | Facility: CLINIC | Age: 53
End: 2018-09-13

## 2018-09-13 NOTE — TELEPHONE ENCOUNTER
----- Message from Roxy Chauhan sent at 9/12/2018  3:58 PM CDT -----  Contact: Mena [phone]  Patient's wife is calling to speak to the staff, in regards to the patient's medication. Please call at your earliest convenience.

## 2018-09-28 ENCOUNTER — LAB VISIT (OUTPATIENT)
Dept: LAB | Facility: HOSPITAL | Age: 53
End: 2018-09-28
Attending: FAMILY MEDICINE
Payer: COMMERCIAL

## 2018-09-28 DIAGNOSIS — B18.2 CHRONIC HEPATITIS C WITHOUT HEPATIC COMA: ICD-10-CM

## 2018-09-28 LAB
ALBUMIN SERPL BCP-MCNC: 3.8 G/DL
ALP SERPL-CCNC: 91 U/L
ALT SERPL W/O P-5'-P-CCNC: 14 U/L
ANION GAP SERPL CALC-SCNC: 6 MMOL/L
AST SERPL-CCNC: 18 U/L
BASOPHILS # BLD AUTO: 0.01 K/UL
BASOPHILS NFR BLD: 0.2 %
BILIRUB SERPL-MCNC: 0.7 MG/DL
BUN SERPL-MCNC: 14 MG/DL
CALCIUM SERPL-MCNC: 9.7 MG/DL
CHLORIDE SERPL-SCNC: 105 MMOL/L
CO2 SERPL-SCNC: 27 MMOL/L
CREAT SERPL-MCNC: 1 MG/DL
DIFFERENTIAL METHOD: ABNORMAL
EOSINOPHIL # BLD AUTO: 0.4 K/UL
EOSINOPHIL NFR BLD: 6 %
ERYTHROCYTE [DISTWIDTH] IN BLOOD BY AUTOMATED COUNT: 12.6 %
EST. GFR  (AFRICAN AMERICAN): >60 ML/MIN/1.73 M^2
EST. GFR  (NON AFRICAN AMERICAN): >60 ML/MIN/1.73 M^2
GLUCOSE SERPL-MCNC: 100 MG/DL
HCT VFR BLD AUTO: 47.6 %
HGB BLD-MCNC: 16.7 G/DL
LYMPHOCYTES # BLD AUTO: 2.4 K/UL
LYMPHOCYTES NFR BLD: 39.4 %
MCH RBC QN AUTO: 32.3 PG
MCHC RBC AUTO-ENTMCNC: 35.1 G/DL
MCV RBC AUTO: 92 FL
MONOCYTES # BLD AUTO: 0.5 K/UL
MONOCYTES NFR BLD: 8.9 %
NEUTROPHILS # BLD AUTO: 2.7 K/UL
NEUTROPHILS NFR BLD: 45.5 %
PLATELET # BLD AUTO: 214 K/UL
PMV BLD AUTO: 10.4 FL
POTASSIUM SERPL-SCNC: 4.6 MMOL/L
PROT SERPL-MCNC: 7 G/DL
RBC # BLD AUTO: 5.17 M/UL
SODIUM SERPL-SCNC: 138 MMOL/L
WBC # BLD AUTO: 5.97 K/UL

## 2018-09-28 PROCEDURE — 36415 COLL VENOUS BLD VENIPUNCTURE: CPT | Mod: PN

## 2018-09-28 PROCEDURE — 80053 COMPREHEN METABOLIC PANEL: CPT

## 2018-09-28 PROCEDURE — 85025 COMPLETE CBC W/AUTO DIFF WBC: CPT

## 2018-10-04 ENCOUNTER — TELEPHONE (OUTPATIENT)
Dept: FAMILY MEDICINE | Facility: CLINIC | Age: 53
End: 2018-10-04

## 2018-10-05 NOTE — TELEPHONE ENCOUNTER
Spoke with patient stated that when he called for medication refill, he called after on , despite having been told previously to not wait that long.  At that point his prior authorization had .  As such the pharmacy could not send his last for weeks of medication.    I called [facility] specialty pharmacy, and spent over an hour re-initiated prior authorization only to be told at the end and needed to call another number ([phone]) to get authorization for the patient's medications.    When I called, I was informed that there was no one available to could handle hepatitis C prior authorization at that and I was transferred to different locations until they could find a tech who could take prior authorization for hepatitis-C medications.  However she informed me that the case would need to be reviewed by a clinician and that no one would be available until morning.  She stated that she marked the case as urgent.  She gave me the following prior authorization [id number], and to call back after 7:30 AM central time at [phone].  I once again stressed the importance of not interrupting as this could cause resistance.

## 2018-10-05 NOTE — TELEPHONE ENCOUNTER
Patient made aware of medication delivery on 10/6/18. Patient states he will be home for delivery. No further questions or concerns.

## 2018-10-05 NOTE — TELEPHONE ENCOUNTER
"Spoke to staff at insurance at medication approved through 11/1/18.  Spoke with pharmacist and medication will be overnighted to patient address they have on file "[address]"  Someone has to be home to accept delivery.  "

## 2018-10-08 ENCOUNTER — TELEPHONE (OUTPATIENT)
Dept: FAMILY MEDICINE | Facility: CLINIC | Age: 53
End: 2018-10-08

## 2018-10-08 NOTE — TELEPHONE ENCOUNTER
----- Message from Karin Antoine sent at 10/5/2018  7:14 AM CDT -----  Contact: self  [employer] extended the p/a for ledipasvir-sofosbuvir (HARVONI)  mg Tab to 11/1/18

## 2018-10-11 ENCOUNTER — TELEPHONE (OUTPATIENT)
Dept: FAMILY MEDICINE | Facility: CLINIC | Age: 53
End: 2018-10-11

## 2018-10-11 NOTE — TELEPHONE ENCOUNTER
----- Message from Leslie Mendoza MA sent at 10/11/2018  9:58 AM CDT -----  Contact: Self  How do I handle this  ----- Message -----  From: Desiree Lubin  Sent: 10/11/2018   8:28 AM  To: [facility] Staff    Patient called to schedule hep b injections. Please call [phone]

## 2018-10-11 NOTE — TELEPHONE ENCOUNTER
Patient expressed understanding and will soraya back after the 17th to [facility] labs

## 2018-10-26 ENCOUNTER — PATIENT OUTREACH (OUTPATIENT)
Dept: ADMINISTRATIVE | Facility: HOSPITAL | Age: 53
End: 2018-10-26

## 2018-10-26 NOTE — PROGRESS NOTES
Spoke with pt in regards to overdue HM,pt has not had a colonoscopy done, would like have this scheduled.

## 2018-10-31 ENCOUNTER — OFFICE VISIT (OUTPATIENT)
Dept: URGENT CARE | Facility: CLINIC | Age: 53
End: 2018-10-31
Payer: COMMERCIAL

## 2018-10-31 VITALS
HEART RATE: 70 BPM | OXYGEN SATURATION: 98 % | BODY MASS INDEX: 26.8 KG/M2 | WEIGHT: 157 LBS | TEMPERATURE: 99 F | HEIGHT: 64 IN | SYSTOLIC BLOOD PRESSURE: 134 MMHG | DIASTOLIC BLOOD PRESSURE: 90 MMHG

## 2018-10-31 DIAGNOSIS — M70.22 OLECRANON BURSITIS OF LEFT ELBOW: Primary | ICD-10-CM

## 2018-10-31 PROCEDURE — 99214 OFFICE O/P EST MOD 30 MIN: CPT | Mod: 25,S$GLB,, | Performed by: NURSE PRACTITIONER

## 2018-10-31 PROCEDURE — 96372 THER/PROPH/DIAG INJ SC/IM: CPT | Mod: S$GLB,,, | Performed by: NURSE PRACTITIONER

## 2018-10-31 RX ORDER — DOXYCYCLINE 100 MG/1
100 CAPSULE ORAL 2 TIMES DAILY
Qty: 14 CAPSULE | Refills: 0 | Status: SHIPPED | OUTPATIENT
Start: 2018-10-31 | End: 2018-11-07

## 2018-10-31 RX ORDER — IBUPROFEN 800 MG/1
800 TABLET ORAL EVERY 8 HOURS PRN
Qty: 20 TABLET | Refills: 0 | Status: SHIPPED | OUTPATIENT
Start: 2018-10-31 | End: 2018-11-10

## 2018-10-31 RX ORDER — BETAMETHASONE SODIUM PHOSPHATE AND BETAMETHASONE ACETATE 3; 3 MG/ML; MG/ML
9 INJECTION, SUSPENSION INTRA-ARTICULAR; INTRALESIONAL; INTRAMUSCULAR; SOFT TISSUE
Status: COMPLETED | OUTPATIENT
Start: 2018-10-31 | End: 2018-10-31

## 2018-10-31 RX ORDER — LEDIPASVIR AND SOFOSBUVIR 90; 400 MG/1; MG/1
TABLET, FILM COATED ORAL
COMMUNITY
Start: 2018-10-05 | End: 2018-12-26 | Stop reason: ALTCHOICE

## 2018-10-31 RX ADMIN — BETAMETHASONE SODIUM PHOSPHATE AND BETAMETHASONE ACETATE 9 MG: 3; 3 INJECTION, SUSPENSION INTRA-ARTICULAR; INTRALESIONAL; INTRAMUSCULAR; SOFT TISSUE at 04:10

## 2018-10-31 NOTE — PATIENT INSTRUCTIONS
Start taking antiinflammatories and follow up with PCP as discussed in clinic.   If you were prescribed a narcotic medication, do not drive or operate heavy equipment or machinery while taking these medications.  You must understand that you've received an Urgent Care treatment only and that you may be released before all your medical problems are known or treated. You, the patient, will arrange for follow up care as instructed.  If your condition worsens we recommend that you receive another evaluation at the emergency room immediately or contact your primary medical clinics after hours call service to discuss your concerns.  Please return here or go to the Emergency Department for any concerns or worsening of condition.  Bursitis of the Elbow (Olecranon)  Your elbow joint contains a small fluid-filled sac called a bursa. The bursa helps the muscles and tendons move smoothly over the bone. It also cushions and protects your elbow. Bursitis is when the bursa is inflamed or swollen. This is most often due to overuse of or injury to the elbow. Symptoms include swelling and pain. If the elbow is red and feels warm to the touch, the bursa itself may be infected.  In most cases, elbow bursitis resolves with medicine and self-care at home. It may take several weeks for the bursa to heal and the swelling to go away. In some cases, your healthcare provider may drain excess fluid from the bursa. Or, he or she may inject medicine directly into the bursa to help relieve symptoms. In severe cases, you may need surgery to remove the bursa may. If there is concern that the bursa is infected, your healthcare provider may prescribe antibiotics to treat the infection.    Home care  Your healthcare provider may prescribe medicine to help relieve pain and swelling. This may be an over-the-counter pain reliever or prescription pain medicine. Take all medicines as directed. To help treat or prevent infection, your provider may prescribe  antibiotics. If these are prescribed, take them as directed until they are gone.  The following are general care guidelines:  · Apply an ice pack or bag of frozen peas wrapped in a thin towel to your elbow for 15 to 20 minutes at a time. Do this 3 to 4 times a day until pain and swelling improve.  · Keep your elbow raised above the level of your heart whenever possible. This helps reduce swelling. When sitting or lying down, place your arm on a pillow that rests on your chest or on a pillow at your side.  · Use an elastic wrap around the elbow joint to compress the area while it is healing. Make the wrap snug but not tight to the point of causing pain.  · Rest your elbow to give it time to heal. You may need to wear an elbow pad to help protect and limit the movement of your elbow. During and after healing, avoid leaning on your elbows.  Follow-up care  Follow up with your healthcare provider, or as advised. If you have been referred to a specialist, make that appointment promptly.  When to seek medical advice  Call your healthcare provider right away if any of these occur:  · Fever of 100.4°F (38°C) or higher, or as advised  · Chills  · Increased pain, swelling, warmth, redness, or drainage from the joint  · Trouble moving the elbow joint  · Numbness or tingling in the hand  · Severe pain or swelling in forearm or hand  · Loss of pink color and slow return of color after squeezing fingertip or hand  Date Last Reviewed: 6/1/2016  © 1268-3788 [employer]. [address] [place] [address], [place] [address]. All rights reserved. This information is not intended as a substitute for professional medical care. Always follow your healthcare professional's instructions.

## 2018-10-31 NOTE — PROGRESS NOTES
"Subjective:       Patient ID: Bryn Camarillo Jr. is a 52 y.o. male.    Vitals:  height is 5' 4" (1.626 m) and weight is 71.2 kg (157 lb). His temperature is 99.1 °F (37.3 °C). His blood pressure is 134/90 (abnormal) and his pulse is 70. His oxygen saturation is 98%.     Chief Complaint: Elbow Pain (left elbow)    Pt reports having left elbow pain and swelling for 2 weeks. He has a maniac.         Elbow Pain   This is a new problem. Episode onset: 14 days. Pertinent negatives include no abdominal pain, chest pain, chills, fever, headaches, nausea, rash, sore throat or vomiting.     Review of Systems   Constitution: Negative for chills and fever.   HENT: Negative for sore throat.    Eyes: Negative for blurred vision.   Cardiovascular: Negative for chest pain.   Respiratory: Negative for shortness of breath.    Skin: Negative for rash.   Musculoskeletal: Negative for back pain and joint pain.   Gastrointestinal: Negative for abdominal pain, diarrhea, nausea and vomiting.   Neurological: Negative for headaches.   Psychiatric/Behavioral: The patient is not nervous/anxious.        Objective:      Physical Exam   Constitutional: He is oriented to person, place, and time. He appears well-developed and well-nourished.   HENT:   Head: Normocephalic and atraumatic. Head is without abrasion, without contusion and without laceration.   Right Ear: External ear normal.   Left Ear: External ear normal.   Nose: Nose normal.   Mouth/Throat: Oropharynx is clear and moist.   Eyes: Conjunctivae, EOM and lids are normal. Pupils are equal, round, and reactive to light.   Neck: Trachea normal, full passive range of motion without pain and phonation normal. Neck supple.   Cardiovascular: Normal rate, regular rhythm and normal heart sounds.   Pulmonary/Chest: Effort normal and breath sounds normal. No stridor. No respiratory distress.   Musculoskeletal: Normal range of motion.   Neurological: He is alert and oriented to person, place, and " time.   Skin: Skin is warm, dry and intact. Capillary refill takes less than 2 seconds. No abrasion, no bruising, no burn, no ecchymosis, no laceration, no lesion and no rash noted. There is erythema.        fluctuant mass noted over olecranon process   Psychiatric: He has a normal mood and affect. His speech is normal and behavior is normal. Judgment and thought content normal. Cognition and memory are normal.   Nursing note and vitals reviewed.      Assessment:       1. Olecranon bursitis of left elbow        Plan:         Dr. Dukes also evaluated and agrees with diagnosis and plan of care.     Olecranon bursitis of left elbow  -     betamethasone acetate-betamethasone sodium phosphate injection 9 mg  -     doxycycline (VIBRAMYCIN) 100 MG Cap; Take 1 capsule (100 mg total) by mouth 2 (two) times daily. for 7 days  Dispense: 14 capsule; Refill: 0  -     ibuprofen (ADVIL,MOTRIN) 800 MG tablet; Take 1 tablet (800 mg total) by mouth every 8 (eight) hours as needed for Pain.  Dispense: 20 tablet; Refill: 0            Patient Instructions   Start taking antiinflammatories and follow up with PCP as discussed in clinic.   If you were prescribed a narcotic medication, do not drive or operate heavy equipment or machinery while taking these medications.  You must understand that you've received an Urgent Care treatment only and that you may be released before all your medical problems are known or treated. You, the patient, will arrange for follow up care as instructed.  If your condition worsens we recommend that you receive another evaluation at the emergency room immediately or contact your primary medical clinics after hours call service to discuss your concerns.  Please return here or go to the Emergency Department for any concerns or worsening of condition.  Bursitis of the Elbow (Olecranon)  Your elbow joint contains a small fluid-filled sac called a bursa. The bursa helps the muscles and tendons move smoothly over  the bone. It also cushions and protects your elbow. Bursitis is when the bursa is inflamed or swollen. This is most often due to overuse of or injury to the elbow. Symptoms include swelling and pain. If the elbow is red and feels warm to the touch, the bursa itself may be infected.  In most cases, elbow bursitis resolves with medicine and self-care at home. It may take several weeks for the bursa to heal and the swelling to go away. In some cases, your healthcare provider may drain excess fluid from the bursa. Or, he or she may inject medicine directly into the bursa to help relieve symptoms. In severe cases, you may need surgery to remove the bursa may. If there is concern that the bursa is infected, your healthcare provider may prescribe antibiotics to treat the infection.    Home care  Your healthcare provider may prescribe medicine to help relieve pain and swelling. This may be an over-the-counter pain reliever or prescription pain medicine. Take all medicines as directed. To help treat or prevent infection, your provider may prescribe antibiotics. If these are prescribed, take them as directed until they are gone.  The following are general care guidelines:  · Apply an ice pack or bag of frozen peas wrapped in a thin towel to your elbow for 15 to 20 minutes at a time. Do this 3 to 4 times a day until pain and swelling improve.  · Keep your elbow raised above the level of your heart whenever possible. This helps reduce swelling. When sitting or lying down, place your arm on a pillow that rests on your chest or on a pillow at your side.  · Use an elastic wrap around the elbow joint to compress the area while it is healing. Make the wrap snug but not tight to the point of causing pain.  · Rest your elbow to give it time to heal. You may need to wear an elbow pad to help protect and limit the movement of your elbow. During and after healing, avoid leaning on your elbows.  Follow-up care  Follow up with your  healthcare provider, or as advised. If you have been referred to a specialist, make that appointment promptly.  When to seek medical advice  Call your healthcare provider right away if any of these occur:  · Fever of 100.4°F (38°C) or higher, or as advised  · Chills  · Increased pain, swelling, warmth, redness, or drainage from the joint  · Trouble moving the elbow joint  · Numbness or tingling in the hand  · Severe pain or swelling in forearm or hand  · Loss of pink color and slow return of color after squeezing fingertip or hand  Date Last Reviewed: 6/1/2016  © 2564-3274 [employer]. [address] [place] [address], [place] [address]. All rights reserved. This information is not intended as a substitute for professional medical care. Always follow your healthcare professional's instructions.

## 2018-11-02 ENCOUNTER — PATIENT OUTREACH (OUTPATIENT)
Dept: ADMINISTRATIVE | Facility: HOSPITAL | Age: 53
End: 2018-11-02

## 2018-11-07 ENCOUNTER — LAB VISIT (OUTPATIENT)
Dept: LAB | Facility: HOSPITAL | Age: 53
End: 2018-11-07
Payer: COMMERCIAL

## 2018-11-07 DIAGNOSIS — B18.2 CHRONIC HEPATITIS C WITHOUT HEPATIC COMA: ICD-10-CM

## 2018-11-07 LAB
ALBUMIN SERPL BCP-MCNC: 3.8 G/DL
ALP SERPL-CCNC: 94 U/L
ALT SERPL W/O P-5'-P-CCNC: 21 U/L
ANION GAP SERPL CALC-SCNC: 8 MMOL/L
AST SERPL-CCNC: 24 U/L
BILIRUB SERPL-MCNC: 0.6 MG/DL
BUN SERPL-MCNC: 8 MG/DL
CALCIUM SERPL-MCNC: 9.7 MG/DL
CHLORIDE SERPL-SCNC: 107 MMOL/L
CO2 SERPL-SCNC: 22 MMOL/L
CREAT SERPL-MCNC: 0.9 MG/DL
EST. GFR  (AFRICAN AMERICAN): >60 ML/MIN/1.73 M^2
EST. GFR  (NON AFRICAN AMERICAN): >60 ML/MIN/1.73 M^2
GLUCOSE SERPL-MCNC: 60 MG/DL
POTASSIUM SERPL-SCNC: 4.4 MMOL/L
PROT SERPL-MCNC: 7.9 G/DL
SODIUM SERPL-SCNC: 137 MMOL/L

## 2018-11-07 PROCEDURE — 87522 HEPATITIS C REVRS TRNSCRPJ: CPT

## 2018-11-07 PROCEDURE — 80053 COMPREHEN METABOLIC PANEL: CPT

## 2018-11-07 PROCEDURE — 36415 COLL VENOUS BLD VENIPUNCTURE: CPT | Mod: PN

## 2018-11-11 LAB
HCV RNA SERPL NAA+PROBE-LOG IU: 1.15 LOG (10) IU/ML
HCV RNA SERPL QL NAA+PROBE: DETECTED IU/ML
HCV RNA SPEC NAA+PROBE-ACNC: 14 IU/ML

## 2018-11-19 ENCOUNTER — TELEPHONE (OUTPATIENT)
Dept: FAMILY MEDICINE | Facility: CLINIC | Age: 53
End: 2018-11-19

## 2018-11-19 NOTE — TELEPHONE ENCOUNTER
----- Message from Mahsa Cruz MA sent at 11/14/2018  1:39 PM CST -----  Contact: [phone]      ----- Message -----  From: Niki Johnson  Sent: 11/14/2018   1:27 PM  To: [facility] Staff    Pt is requesting his lab results. Lads were done on 11-7-18. Pls call pt [phone]. Thanks..........Holley

## 2018-12-03 ENCOUNTER — TELEPHONE (OUTPATIENT)
Dept: FAMILY MEDICINE | Facility: CLINIC | Age: 53
End: 2018-12-03

## 2018-12-10 ENCOUNTER — PATIENT OUTREACH (OUTPATIENT)
Dept: ADMINISTRATIVE | Facility: HOSPITAL | Age: 53
End: 2018-12-10

## 2018-12-11 ENCOUNTER — OFFICE VISIT (OUTPATIENT)
Dept: FAMILY MEDICINE | Facility: CLINIC | Age: 53
End: 2018-12-11
Payer: COMMERCIAL

## 2018-12-11 ENCOUNTER — LAB VISIT (OUTPATIENT)
Dept: LAB | Facility: HOSPITAL | Age: 53
End: 2018-12-11
Attending: FAMILY MEDICINE
Payer: COMMERCIAL

## 2018-12-11 VITALS
RESPIRATION RATE: 18 BRPM | HEART RATE: 80 BPM | BODY MASS INDEX: 27.33 KG/M2 | SYSTOLIC BLOOD PRESSURE: 118 MMHG | DIASTOLIC BLOOD PRESSURE: 82 MMHG | HEIGHT: 64 IN | WEIGHT: 160.06 LBS

## 2018-12-11 DIAGNOSIS — Z23 NEED FOR VACCINATION: ICD-10-CM

## 2018-12-11 DIAGNOSIS — F17.200 SMOKING: ICD-10-CM

## 2018-12-11 DIAGNOSIS — B18.2 CHRONIC HEPATITIS C WITHOUT HEPATIC COMA: Primary | ICD-10-CM

## 2018-12-11 DIAGNOSIS — B18.2 CHRONIC HEPATITIS C WITHOUT HEPATIC COMA: ICD-10-CM

## 2018-12-11 PROCEDURE — 90471 IMMUNIZATION ADMIN: CPT | Mod: S$GLB,,, | Performed by: FAMILY MEDICINE

## 2018-12-11 PROCEDURE — 90746 HEPB VACCINE 3 DOSE ADULT IM: CPT | Mod: S$GLB,,, | Performed by: FAMILY MEDICINE

## 2018-12-11 PROCEDURE — 99999 PR PBB SHADOW E&M-EST. PATIENT-LVL III: CPT | Mod: PBBFAC,,, | Performed by: FAMILY MEDICINE

## 2018-12-11 PROCEDURE — 99214 OFFICE O/P EST MOD 30 MIN: CPT | Mod: 25,S$GLB,, | Performed by: FAMILY MEDICINE

## 2018-12-11 PROCEDURE — 87522 HEPATITIS C REVRS TRNSCRPJ: CPT

## 2018-12-11 NOTE — PROGRESS NOTES
Routine Office Visit    Patient Name: Bryn Camarillo Jr.    : 1965  MRN: [id number]    Subjective:  Bryn is a 53 y.o. male who presents today for:   Chief Complaint   Patient presents with    Hepatitis C     53-year-old male comes in for and treatment hep C labs result.  He reports that he took all his medication.  He states that he had missed only 1 day but took a soon as she remembered.  He states that he tried to take at the same time every day but occasionally it will be off by couple of hours.  He reports that because the treatment he has greatly decreased the amount of smoking, and has also decrease his alcohol intake.  The patient expresses that he would like to try Chantix for smoking cessation.    Past Medical History  Past Medical History:   Diagnosis Date    Alcohol abuse     Tobacco abuse        Past Surgical History  Past Surgical History:   Procedure Laterality Date    NO PAST SURGERIES          Family History  Family History   Problem Relation Age of Onset    Hypertension Father     Stroke Father     Alcohol abuse Mother        Social History  Social History     Socioeconomic History    Marital status:      Spouse name: Not on file    Number of children: Not on file    Years of education: Not on file    Highest education level: Not on file   Social Needs    Financial resource strain: Not on file    Food insecurity - worry: Not on file    Food insecurity - inability: Not on file    Transportation needs - medical: Not on file    Transportation needs - non-medical: Not on file   Occupational History    Not on file   Tobacco Use    Smoking status: Current Every Day Smoker     Packs/day: 0.50     Years: 25.00     Pack years: 12.50    Smokeless tobacco: Never Used   Substance and Sexual Activity    Alcohol use: Yes     Comment: 140 oz/week    Drug use: No    Sexual activity: Yes     Partners: Female   Other Topics Concern    Not on file   Social History Narrative     "Not on file       Current Medications  Current Outpatient Medications on File Prior to Visit   Medication Sig Dispense Refill    HARVONI  mg Tab        No current facility-administered medications on file prior to visit.        Allergies   Review of patient's allergies indicates:  No Known Allergies    Review of Systems   Constitutional: Negative for unexpected weight change.   HENT: Negative for ear pain and sore throat.    Eyes: Negative for visual disturbance.   Respiratory: Negative for shortness of breath.    Cardiovascular: Negative for chest pain.   Gastrointestinal: Negative for abdominal pain and blood in stool.   Endocrine: Negative for cold intolerance and heat intolerance.   Genitourinary: Negative for dysuria and frequency.   Skin: Negative for rash.   Neurological: Negative for weakness, numbness and headaches.   Hematological: Negative for adenopathy.   Psychiatric/Behavioral: Negative for suicidal ideas.       /82 (BP Location: Left arm, Patient Position: Sitting, BP Method: Medium (Manual))   Pulse 80   Resp 18   Ht 5' 4" (1.626 m)   Wt 72.6 kg (160 lb 0.9 oz)   BMI 27.47 kg/m²     Physical Exam   Constitutional: He appears well-developed and well-nourished.   HENT:   Head: Normocephalic.   Right Ear: External ear normal.   Left Ear: External ear normal.   Nose: Nose normal.   Mouth/Throat: No oropharyngeal exudate.   Neck: Normal range of motion. Neck supple. No tracheal deviation present.   Cardiovascular: Normal rate, regular rhythm, normal heart sounds and intact distal pulses.   No murmur heard.  Pulmonary/Chest: Effort normal and breath sounds normal. He has no wheezes. He has no rales.   Abdominal: Soft. Bowel sounds are normal. He exhibits no mass. There is no tenderness.   Musculoskeletal: He exhibits no edema.   Lymphadenopathy:     He has no cervical adenopathy.   Skin: He is not diaphoretic.   Vitals reviewed.    Lab Visit on 11/07/2018   Component Date Value Ref Range " Status    HCV Log 11/07/2018 1.15* <1.08 Log (10) IU/mL Final    Comment: This procedure utilizes a real-time polymerase chain   reaction test from [employer].  The amplification   target is a conserved region of the HCV genome.  The lower  limit of quantitation is 12 IU/mL (1.08 Log IU/mL) and the   upper limit of quantitation is 100 million IU/mL (8.00 Log  IU/mL). The qualitative limit of detection is 12 IU/mL   (1.08 Log IU/mL).  Specimens reported as DETECTED but <12 IU/mL contain   detectable levels of hepatitis C RNA but the viral load is  below the limit of quantitation.  A Not detected result  does not rule out infection.  Test performed at [facility],  [address] , [place]  [address] [phone]  Jose Juan Palomares MD  - Medical Director      HCV, Qualitative 11/07/2018 DETECTED* Not detected IU/mL Final    HCV RNA Quant PCR 11/07/2018 14* <12 IU/mL Final    Sodium 11/07/2018 137  136 - 145 mmol/L Final    Potassium 11/07/2018 4.4  3.5 - 5.1 mmol/L Final    Chloride 11/07/2018 107  95 - 110 mmol/L Final    CO2 11/07/2018 22* 23 - 29 mmol/L Final    Glucose 11/07/2018 60* 70 - 110 mg/dL Final    BUN, Bld 11/07/2018 8  6 - 20 mg/dL Final    Creatinine 11/07/2018 0.9  0.5 - 1.4 mg/dL Final    Calcium 11/07/2018 9.7  8.7 - 10.5 mg/dL Final    Total Protein 11/07/2018 7.9  6.0 - 8.4 g/dL Final    Albumin 11/07/2018 3.8  3.5 - 5.2 g/dL Final    Total Bilirubin 11/07/2018 0.6  0.1 - 1.0 mg/dL Final    Comment: For infants and newborns, interpretation of results should be based  on gestational age, weight and in agreement with clinical  observations.  Premature Infant recommended reference ranges:  Up to 24 hours.............<8.0 mg/dL  Up to 48 hours............<12.0 mg/dL  3-5 days..................<15.0 mg/dL  6-29 days.................<15.0 mg/dL      Alkaline Phosphatase 11/07/2018 94  55 - 135 U/L Final    AST 11/07/2018 24  10 - 40 U/L Final    ALT 11/07/2018  21  10 - 44 U/L Final    Anion Gap 11/07/2018 8  8 - 16 mmol/L Final    eGFR if African American 11/07/2018 >60  >60 mL/min/1.73 m^2 Final    eGFR if non African American 11/07/2018 >60  >60 mL/min/1.73 m^2 Final    Comment: Calculation used to obtain the estimated glomerular filtration  rate (eGFR) is the CKD-EPI equation.      Lab Visit on 09/28/2018   Component Date Value Ref Range Status    WBC 09/28/2018 5.97  3.90 - 12.70 K/uL Final    RBC 09/28/2018 5.17  4.60 - 6.20 M/uL Final    Hemoglobin 09/28/2018 16.7  14.0 - 18.0 g/dL Final    Hematocrit 09/28/2018 47.6  40.0 - 54.0 % Final    MCV 09/28/2018 92  82 - 98 fL Final    MCH 09/28/2018 32.3* 27.0 - 31.0 pg Final    MCHC 09/28/2018 35.1  32.0 - 36.0 g/dL Final    RDW 09/28/2018 12.6  11.5 - 14.5 % Final    Platelets 09/28/2018 214  150 - 350 K/uL Final    MPV 09/28/2018 10.4  9.2 - 12.9 fL Final    Gran # (ANC) 09/28/2018 2.7  1.8 - 7.7 K/uL Final    Lymph # 09/28/2018 2.4  1.0 - 4.8 K/uL Final    Mono # 09/28/2018 0.5  0.3 - 1.0 K/uL Final    Eos # 09/28/2018 0.4  0.0 - 0.5 K/uL Final    Baso # 09/28/2018 0.01  0.00 - 0.20 K/uL Final    Gran% 09/28/2018 45.5  38.0 - 73.0 % Final    Lymph% 09/28/2018 39.4  18.0 - 48.0 % Final    Mono% 09/28/2018 8.9  4.0 - 15.0 % Final    Eosinophil% 09/28/2018 6.0  0.0 - 8.0 % Final    Basophil% 09/28/2018 0.2  0.0 - 1.9 % Final    Differential Method 09/28/2018 Automated   Final    Sodium 09/28/2018 138  136 - 145 mmol/L Final    Potassium 09/28/2018 4.6  3.5 - 5.1 mmol/L Final    Chloride 09/28/2018 105  95 - 110 mmol/L Final    CO2 09/28/2018 27  23 - 29 mmol/L Final    Glucose 09/28/2018 100  70 - 110 mg/dL Final    BUN, Bld 09/28/2018 14  6 - 20 mg/dL Final    Creatinine 09/28/2018 1.0  0.5 - 1.4 mg/dL Final    Calcium 09/28/2018 9.7  8.7 - 10.5 mg/dL Final    Total Protein 09/28/2018 7.0  6.0 - 8.4 g/dL Final    Albumin 09/28/2018 3.8  3.5 - 5.2 g/dL Final    Total Bilirubin  09/28/2018 0.7  0.1 - 1.0 mg/dL Final    Comment: For infants and newborns, interpretation of results should be based  on gestational age, weight and in agreement with clinical  observations.  Premature Infant recommended reference ranges:  Up to 24 hours.............<8.0 mg/dL  Up to 48 hours............<12.0 mg/dL  3-5 days..................<15.0 mg/dL  6-29 days.................<15.0 mg/dL      Alkaline Phosphatase 09/28/2018 91  55 - 135 U/L Final    AST 09/28/2018 18  10 - 40 U/L Final    ALT 09/28/2018 14  10 - 44 U/L Final    Anion Gap 09/28/2018 6* 8 - 16 mmol/L Final    eGFR if  09/28/2018 >60  >60 mL/min/1.73 m^2 Final    eGFR if non African American 09/28/2018 >60  >60 mL/min/1.73 m^2 Final    Comment: Calculation used to obtain the estimated glomerular filtration  rate (eGFR) is the CKD-EPI equation.              Assessment/Plan:  Bryn was seen today for hepatitis c.    Diagnoses and all orders for this visit:    Chronic hepatitis C without hepatic coma  -     Hepatitis C RNA, quantitative, PCR; Future  -     HEPATITIS C GENOTYPE; Future  -     HCV RNA NS5A RESISTANCE,BETTY. 1; Future  Patient is very low shows treatment failure.  The will recheck for resistance, and make sure no other genotype.  Will likely send for prior authorization for retreatment with [facility].      Need for vaccination  -     (In Office Administered) Hepatitis B Vaccine (Adult) (IM)  2nd dose of Hep B vaccine today.    Smoking  Will make sure what medication I can get approved for HCV, by insurance prior to starting patient on smoking cessation medication            This office note has been dictated.  This dictation has been generated using M-Modal Fluency Direct dictation; some phonetic errors may occur.

## 2018-12-26 ENCOUNTER — TELEPHONE (OUTPATIENT)
Dept: FAMILY MEDICINE | Facility: CLINIC | Age: 53
End: 2018-12-26

## 2018-12-26 ENCOUNTER — OFFICE VISIT (OUTPATIENT)
Dept: FAMILY MEDICINE | Facility: CLINIC | Age: 53
End: 2018-12-26
Payer: COMMERCIAL

## 2018-12-26 VITALS
OXYGEN SATURATION: 97 % | SYSTOLIC BLOOD PRESSURE: 120 MMHG | HEIGHT: 64 IN | TEMPERATURE: 98 F | WEIGHT: 159.63 LBS | RESPIRATION RATE: 18 BRPM | HEART RATE: 83 BPM | BODY MASS INDEX: 27.25 KG/M2 | DIASTOLIC BLOOD PRESSURE: 80 MMHG

## 2018-12-26 DIAGNOSIS — F17.200 SMOKING: Primary | ICD-10-CM

## 2018-12-26 DIAGNOSIS — B35.4 RINGWORM OF BODY: Primary | ICD-10-CM

## 2018-12-26 DIAGNOSIS — F17.200 SMOKING: ICD-10-CM

## 2018-12-26 PROCEDURE — 99214 OFFICE O/P EST MOD 30 MIN: CPT | Mod: S$GLB,,, | Performed by: FAMILY MEDICINE

## 2018-12-26 PROCEDURE — 99999 PR PBB SHADOW E&M-EST. PATIENT-LVL III: CPT | Mod: PBBFAC,,, | Performed by: FAMILY MEDICINE

## 2018-12-26 RX ORDER — VARENICLINE TARTRATE 0.5 (11)-1
KIT ORAL
Qty: 1 PACKAGE | Refills: 0 | Status: SHIPPED | OUTPATIENT
Start: 2018-12-26 | End: 2019-11-26

## 2018-12-26 RX ORDER — CLOTRIMAZOLE AND BETAMETHASONE DIPROPIONATE 10; .64 MG/G; MG/G
CREAM TOPICAL 2 TIMES DAILY
Qty: 45 G | Refills: 0 | Status: SHIPPED | OUTPATIENT
Start: 2018-12-26 | End: 2019-01-09

## 2018-12-26 RX ORDER — BUPROPION HYDROCHLORIDE 150 MG/1
TABLET, EXTENDED RELEASE ORAL
Qty: 57 TABLET | Refills: 0 | Status: SHIPPED | OUTPATIENT
Start: 2018-12-26 | End: 2019-01-25

## 2018-12-26 NOTE — TELEPHONE ENCOUNTER
I called patient, and explained to him how to take Zyban correctly.  Take twice a day  by least 8 hr, and 2nd dosage should be no later than 6:00 p.m..

## 2018-12-26 NOTE — PROGRESS NOTES
Routine Office Visit    Patient Name: Bryn Camarillo Jr.    : 1965  MRN: [id number]    Subjective:  Bryn is a 53 y.o. male who presents today for:   Chief Complaint   Patient presents with    Rash     back of neck 2 mos onset--itchy       53-year-old male comes in for evaluation of a rash on the back of his neck for the last 2 months.  He states that it is extremely itchy.  He states that there has been no improvement in the rash.  He is unsure what caused it, but states that he thinks in may be related to the laundry detergent the use at work.  He states that since the rash started it has spread from a small area on the side of his neck to the entire back of the neck.  The periphery of the lesion is somewhat scaly and reddened.  He denies any discharge from the area.  He denies any pain.  Denies any increased warmth.    The patient is a smoker, and is requesting prescription for Chantix for smoking cessation.  He has never taken this in the past.  He has tried to quit cold turkey unsuccessfully.  He has no history of depression, suicidal thoughts, or seizure disorder.    Past Medical History  Past Medical History:   Diagnosis Date    Alcohol abuse     Tobacco abuse        Past Surgical History  Past Surgical History:   Procedure Laterality Date    NO PAST SURGERIES          Family History  Family History   Problem Relation Age of Onset    Hypertension Father     Stroke Father     Alcohol abuse Mother        Social History  Social History     Socioeconomic History    Marital status:      Spouse name: Not on file    Number of children: Not on file    Years of education: Not on file    Highest education level: Not on file   Social Needs    Financial resource strain: Not on file    Food insecurity - worry: Not on file    Food insecurity - inability: Not on file    Transportation needs - medical: Not on file    Transportation needs - non-medical: Not on file   Occupational History    Not  "on file   Tobacco Use    Smoking status: Current Every Day Smoker     Packs/day: 0.50     Years: 25.00     Pack years: 12.50    Smokeless tobacco: Never Used   Substance and Sexual Activity    Alcohol use: Yes     Comment: 140 oz/week    Drug use: No    Sexual activity: Yes     Partners: Female   Other Topics Concern    Not on file   Social History Narrative    Not on file       Current Medications  Current Outpatient Medications on File Prior to Visit   Medication Sig Dispense Refill    HARVONI  mg Tab        No current facility-administered medications on file prior to visit.        Allergies   Review of patient's allergies indicates:  No Known Allergies    Review of Systems   Constitutional: Negative for chills and fever.   Respiratory: Negative for cough, choking, shortness of breath and wheezing.    Cardiovascular: Negative for chest pain and palpitations.   Skin: Positive for rash.   Psychiatric/Behavioral: Negative for confusion, decreased concentration, dysphoric mood, hallucinations and suicidal ideas. The patient is not nervous/anxious and is not hyperactive.          /80 (BP Location: Left arm, Patient Position: Sitting, BP Method: Medium (Manual))   Pulse 83   Temp 97.9 °F (36.6 °C) (Oral)   Resp 18   Ht 5' 4" (1.626 m)   Wt 72.4 kg (159 lb 9.8 oz)   SpO2 97%   BMI 27.40 kg/m²     Physical Exam   Constitutional: He appears well-developed and well-nourished.   HENT:   Head: Normocephalic.   Right Ear: External ear normal.   Left Ear: External ear normal.   Nose: Nose normal.   Mouth/Throat: No oropharyngeal exudate.   Neck: Normal range of motion. Neck supple. No tracheal deviation present.   Cardiovascular: Normal rate, regular rhythm, normal heart sounds and intact distal pulses.   No murmur heard.  Pulmonary/Chest: Effort normal and breath sounds normal. He has no wheezes. He has no rales.   Abdominal: Soft. Bowel sounds are normal. He exhibits no mass. There is no " tenderness.   Musculoskeletal: He exhibits no edema.   Lymphadenopathy:     He has no cervical adenopathy.   Skin: Rash (see image) noted. He is not diaphoretic.   Rash in posterior neck is slightly erythematous with a solitary border which is slightly raised   Vitals reviewed.        Assessment/Plan:  Bryn was seen today for rash.    Diagnoses and all orders for this visit:    Ringworm of body  -     clotrimazole-betamethasone 1-0.05% (LOTRISONE) cream; Apply topically 2 (two) times daily. for 14 days  Will treat with Lotrisone twice daily.  If there is no improvement within 2 weeks, will adjust treatment.  Discussed with patient that he can auto in ocular late other parts of his body if he touches the area and intact his another body part.  Also discussed not to that other people come in contact with the area.    Smoking  -     CHANTIX STARTING MONTH BOX 0.5 mg (11)- 1 mg (42) tablet; Take one 0.5mg tab by mouth once daily X3 days,then increase to one 0.5mg tab twice daily X4 days,then increase to one 1mg tab twice daily  Patient applauded on decision to quit smoking.  Risks and side effects of Chantix were discussed.  Patient is agreeable to proceed with the medication.  I explained to him how to use it.  He is to message over week before the starter pack finishes for prescription for continuation pack.        This office note has been dictated.  This dictation has been generated using M-Modal Fluency Direct dictation; some phonetic errors may occur.

## 2018-12-26 NOTE — TELEPHONE ENCOUNTER
Chantix was denied. Will try and send Zyban.    I called pharmacy and they confirmed this is covered.

## 2019-01-07 ENCOUNTER — CLINICAL SUPPORT (OUTPATIENT)
Dept: FAMILY MEDICINE | Facility: CLINIC | Age: 54
End: 2019-01-07
Payer: COMMERCIAL

## 2019-01-07 DIAGNOSIS — Z23 NEED FOR VACCINATION: Primary | ICD-10-CM

## 2019-01-07 PROCEDURE — 99999 PR PBB SHADOW E&M-EST. PATIENT-LVL I: CPT | Mod: PBBFAC,,,

## 2019-01-07 PROCEDURE — 99999 PR PBB SHADOW E&M-EST. PATIENT-LVL I: ICD-10-PCS | Mod: PBBFAC,,,

## 2019-01-07 PROCEDURE — 90732 PPSV23 VACC 2 YRS+ SUBQ/IM: CPT | Mod: S$GLB,,, | Performed by: FAMILY MEDICINE

## 2019-01-07 PROCEDURE — 90471 IMMUNIZATION ADMIN: CPT | Mod: S$GLB,,, | Performed by: FAMILY MEDICINE

## 2019-01-07 PROCEDURE — 90732 PNEUMOCOCCAL POLYSACCHARIDE VACCINE 23-VALENT =>2YO SQ IM: ICD-10-PCS | Mod: S$GLB,,, | Performed by: FAMILY MEDICINE

## 2019-01-07 PROCEDURE — 90471 PNEUMOCOCCAL POLYSACCHARIDE VACCINE 23-VALENT =>2YO SQ IM: ICD-10-PCS | Mod: S$GLB,,, | Performed by: FAMILY MEDICINE

## 2019-01-07 NOTE — PROGRESS NOTES
Pt tolerated Pneumovax 23 injection well. Instructed to wait in the clinic for 15 minutes and report any adverse effects immediately to the nurse. Verbalized understanding.

## 2019-02-01 ENCOUNTER — LAB VISIT (OUTPATIENT)
Dept: LAB | Facility: HOSPITAL | Age: 54
End: 2019-02-01
Attending: FAMILY MEDICINE
Payer: COMMERCIAL

## 2019-02-01 DIAGNOSIS — B18.2 CHRONIC HEPATITIS C WITHOUT HEPATIC COMA: ICD-10-CM

## 2019-02-01 LAB
ALBUMIN SERPL BCP-MCNC: 4.1 G/DL
ALP SERPL-CCNC: 105 U/L
ALT SERPL W/O P-5'-P-CCNC: 14 U/L
ANION GAP SERPL CALC-SCNC: 7 MMOL/L
AST SERPL-CCNC: 20 U/L
BILIRUB SERPL-MCNC: 0.7 MG/DL
BUN SERPL-MCNC: 11 MG/DL
CALCIUM SERPL-MCNC: 9.9 MG/DL
CHLORIDE SERPL-SCNC: 103 MMOL/L
CO2 SERPL-SCNC: 27 MMOL/L
CREAT SERPL-MCNC: 0.9 MG/DL
EST. GFR  (AFRICAN AMERICAN): >60 ML/MIN/1.73 M^2
EST. GFR  (NON AFRICAN AMERICAN): >60 ML/MIN/1.73 M^2
GLUCOSE SERPL-MCNC: 92 MG/DL
POTASSIUM SERPL-SCNC: 4.8 MMOL/L
PROT SERPL-MCNC: 7.9 G/DL
SODIUM SERPL-SCNC: 137 MMOL/L

## 2019-02-01 PROCEDURE — 80053 COMPREHEN METABOLIC PANEL: CPT

## 2019-02-01 PROCEDURE — 87522 HEPATITIS C REVRS TRNSCRPJ: CPT

## 2019-02-01 PROCEDURE — 36415 COLL VENOUS BLD VENIPUNCTURE: CPT | Mod: PN

## 2019-02-04 LAB
HCV RNA SERPL NAA+PROBE-LOG IU: <1.08 LOG (10) IU/ML
HCV RNA SERPL QL NAA+PROBE: NOT DETECTED IU/ML
HCV RNA SPEC NAA+PROBE-ACNC: <12 IU/ML

## 2019-02-18 ENCOUNTER — OFFICE VISIT (OUTPATIENT)
Dept: FAMILY MEDICINE | Facility: CLINIC | Age: 54
End: 2019-02-18
Payer: COMMERCIAL

## 2019-02-18 VITALS
SYSTOLIC BLOOD PRESSURE: 132 MMHG | BODY MASS INDEX: 26.76 KG/M2 | TEMPERATURE: 98 F | HEIGHT: 64 IN | DIASTOLIC BLOOD PRESSURE: 92 MMHG | RESPIRATION RATE: 17 BRPM | HEART RATE: 82 BPM | OXYGEN SATURATION: 97 % | WEIGHT: 156.75 LBS

## 2019-02-18 DIAGNOSIS — Z23 NEED FOR HEPATITIS VACCINATION: ICD-10-CM

## 2019-02-18 DIAGNOSIS — B18.2 CHRONIC HEPATITIS C WITHOUT HEPATIC COMA: Primary | ICD-10-CM

## 2019-02-18 DIAGNOSIS — F17.200 TOBACCO DEPENDENCE: ICD-10-CM

## 2019-02-18 PROCEDURE — 99406 PR TOBACCO USE CESSATION INTERMEDIATE 3-10 MINUTES: ICD-10-PCS | Mod: S$GLB,,, | Performed by: FAMILY MEDICINE

## 2019-02-18 PROCEDURE — 99406 BEHAV CHNG SMOKING 3-10 MIN: CPT | Mod: S$GLB,,, | Performed by: FAMILY MEDICINE

## 2019-02-18 PROCEDURE — 90471 HEPATITIS B VACCINE ADULT IM: ICD-10-PCS | Mod: S$GLB,,, | Performed by: FAMILY MEDICINE

## 2019-02-18 PROCEDURE — 99214 PR OFFICE/OUTPT VISIT, EST, LEVL IV, 30-39 MIN: ICD-10-PCS | Mod: 25,S$GLB,, | Performed by: FAMILY MEDICINE

## 2019-02-18 PROCEDURE — 90471 IMMUNIZATION ADMIN: CPT | Mod: S$GLB,,, | Performed by: FAMILY MEDICINE

## 2019-02-18 PROCEDURE — 90746 HEPB VACCINE 3 DOSE ADULT IM: CPT | Mod: S$GLB,,, | Performed by: FAMILY MEDICINE

## 2019-02-18 PROCEDURE — 90746 HEPATITIS B VACCINE ADULT IM: ICD-10-PCS | Mod: S$GLB,,, | Performed by: FAMILY MEDICINE

## 2019-02-18 PROCEDURE — 99214 OFFICE O/P EST MOD 30 MIN: CPT | Mod: 25,S$GLB,, | Performed by: FAMILY MEDICINE

## 2019-02-18 PROCEDURE — 99999 PR PBB SHADOW E&M-EST. PATIENT-LVL III: CPT | Mod: PBBFAC,,, | Performed by: FAMILY MEDICINE

## 2019-02-18 PROCEDURE — 99999 PR PBB SHADOW E&M-EST. PATIENT-LVL III: ICD-10-PCS | Mod: PBBFAC,,, | Performed by: FAMILY MEDICINE

## 2019-02-18 NOTE — LETTER
February 18, 2019      [facility]  [address]  [place] [address]  Phone: [phone]       Patient: Bryn Camarillo   YOB: 1965  Date of Visit: 02/18/2019    To Whom It May Concern:    Bryn Camarillo  was at [facility] on 02/18/2019. He may return to work on 2/18/2019.     If you have any questions or concerns, or if I can be of further assistance, please do not hesitate to contact me.      Sincerely,      Gene Dumont Jr., MD

## 2019-02-20 NOTE — PROGRESS NOTES
Routine Office Visit    Patient Name: Bryn Camarillo Jr.    : 1965  MRN: [id number]    Subjective:  Bryn is a 53 y.o. male who presents today for:   Chief Complaint   Patient presents with    Hepatitis C    Follow-up     53-year-old male comes in for hepatitis C SVR12 results.  He completed 8 weeks of Harvoni on 2018.  His fibrosis score at initiation was F0 and his viral load was 593,651.    Patient also did to of his 3 hepatitis B vaccination doses and is in need of his last dose.      Past Medical History  Past Medical History:   Diagnosis Date    Alcohol abuse     Tobacco abuse        Past Surgical History  Past Surgical History:   Procedure Laterality Date    NO PAST SURGERIES          Family History  Family History   Problem Relation Age of Onset    Hypertension Father     Stroke Father     Alcohol abuse Mother        Social History  Social History     Socioeconomic History    Marital status:      Spouse name: Not on file    Number of children: Not on file    Years of education: Not on file    Highest education level: Not on file   Social Needs    Financial resource strain: Not on file    Food insecurity - worry: Not on file    Food insecurity - inability: Not on file    Transportation needs - medical: Not on file    Transportation needs - non-medical: Not on file   Occupational History    Not on file   Tobacco Use    Smoking status: Current Every Day Smoker     Packs/day: 0.50     Years: 25.00     Pack years: 12.50    Smokeless tobacco: Never Used   Substance and Sexual Activity    Alcohol use: Yes     Comment: 140 oz/week    Drug use: No    Sexual activity: Yes     Partners: Female   Other Topics Concern    Not on file   Social History Narrative    Not on file       Current Medications  Current Outpatient Medications on File Prior to Visit   Medication Sig Dispense Refill    CHANTIX STARTING MONTH BOX 0.5 mg (11)- 1 mg (42) tablet Take one 0.5mg tab by  "mouth once daily X3 days,then increase to one 0.5mg tab twice daily X4 days,then increase to one 1mg tab twice daily 1 Package 0     No current facility-administered medications on file prior to visit.        Allergies   Review of patient's allergies indicates:  No Known Allergies    Review of Systems   Constitutional: Negative for unexpected weight change.   HENT: Negative for ear pain and sore throat.    Eyes: Negative for visual disturbance.   Respiratory: Negative for shortness of breath.    Cardiovascular: Negative for chest pain.   Gastrointestinal: Negative for abdominal pain and blood in stool.   Endocrine: Negative for cold intolerance and heat intolerance.   Genitourinary: Negative for dysuria and frequency.   Skin: Negative for rash.   Neurological: Negative for weakness, numbness and headaches.   Hematological: Negative for adenopathy.   Psychiatric/Behavioral: Negative for suicidal ideas.       BP (!) 132/92 (BP Location: Left arm, Patient Position: Sitting, BP Method: Medium (Automatic))   Pulse 82   Temp 97.8 °F (36.6 °C) (Oral)   Resp 17   Ht 5' 4" (1.626 m)   Wt 71.1 kg (156 lb 12 oz)   SpO2 97%   BMI 26.91 kg/m²     Physical Exam   Constitutional: He appears well-developed and well-nourished.   HENT:   Head: Normocephalic.   Right Ear: External ear normal.   Left Ear: External ear normal.   Nose: Nose normal.   Mouth/Throat: No oropharyngeal exudate.   Neck: Normal range of motion. Neck supple. No tracheal deviation present.   Cardiovascular: Normal rate, regular rhythm, normal heart sounds and intact distal pulses.   No murmur heard.  Pulmonary/Chest: Effort normal and breath sounds normal. He has no wheezes. He has no rales.   Abdominal: Soft. Bowel sounds are normal. He exhibits no mass. There is no tenderness.   Musculoskeletal: He exhibits no edema.   Lymphadenopathy:     He has no cervical adenopathy.   Skin: He is not diaphoretic.   Vitals reviewed.    Lab Visit on 02/01/2019 "   Component Date Value Ref Range Status    Sodium 02/01/2019 137  136 - 145 mmol/L Final    Potassium 02/01/2019 4.8  3.5 - 5.1 mmol/L Final    Chloride 02/01/2019 103  95 - 110 mmol/L Final    CO2 02/01/2019 27  23 - 29 mmol/L Final    Glucose 02/01/2019 92  70 - 110 mg/dL Final    BUN, Bld 02/01/2019 11  6 - 20 mg/dL Final    Creatinine 02/01/2019 0.9  0.5 - 1.4 mg/dL Final    Calcium 02/01/2019 9.9  8.7 - 10.5 mg/dL Final    Total Protein 02/01/2019 7.9  6.0 - 8.4 g/dL Final    Albumin 02/01/2019 4.1  3.5 - 5.2 g/dL Final    Total Bilirubin 02/01/2019 0.7  0.1 - 1.0 mg/dL Final    Comment: For infants and newborns, interpretation of results should be based  on gestational age, weight and in agreement with clinical  observations.  Premature Infant recommended reference ranges:  Up to 24 hours.............<8.0 mg/dL  Up to 48 hours............<12.0 mg/dL  3-5 days..................<15.0 mg/dL  6-29 days.................<15.0 mg/dL      Alkaline Phosphatase 02/01/2019 105  55 - 135 U/L Final    AST 02/01/2019 20  10 - 40 U/L Final    ALT 02/01/2019 14  10 - 44 U/L Final    Anion Gap 02/01/2019 7* 8 - 16 mmol/L Final    eGFR if African American 02/01/2019 >60  >60 mL/min/1.73 m^2 Final    eGFR if non African American 02/01/2019 >60  >60 mL/min/1.73 m^2 Final    Comment: Calculation used to obtain the estimated glomerular filtration  rate (eGFR) is the CKD-EPI equation.       HCV Log 02/01/2019 <1.08  <1.08 Log (10) IU/mL Final    Comment: This procedure utilizes a real-time polymerase chain   reaction test from [employer].  The amplification   target is a conserved region of the HCV genome.  The lower  limit of quantitation is 12 IU/mL (1.08 Log IU/mL) and the   upper limit of quantitation is 100 million IU/mL (8.00 Log  IU/mL). The qualitative limit of detection is 12 IU/mL   (1.08 Log IU/mL).  Specimens reported as DETECTED but <12 IU/mL contain   detectable levels of hepatitis C RNA but the  viral load is  below the limit of quantitation.  A Not detected result  does not rule out infection.  Test performed at [facility],  [address], [place]  [address]     [phone]  Jose Juan Palomares MD  - Medical Director      HCV, Qualitative 02/01/2019 Not detected  Not detected IU/mL Final    HCV RNA Quant PCR 02/01/2019 <12  <12 IU/mL Final           Assessment/Plan:  Bryn was seen today for hepatitis c and follow-up.    Diagnoses and all orders for this visit:    Chronic hepatitis C without hepatic coma  SVR 12 shows that patient is virologic week cured.  Patient was informed of the meaning of this.    Need for hepatitis vaccination  -     (In Office Administered) Hepatitis B Vaccine (Adult) (IM)  Last dose of hepatitis B vaccine done today as there has been 16 weeks since 1st dose.    Tobacco dependence  -     Ambulatory referral to Smoking Cessation Program  Importance of smoking cessation discussed with patient. 5 minutes spent counseling patient on risks of smoking, benefits of stopping and ways of stopping. Patient states that Wellbutrin did not work for him and is willing to see smoking cessation counselor.          This office note has been dictated.  This dictation has been generated using M-Modal Fluency Direct dictation; some phonetic errors may occur.

## 2019-08-22 DIAGNOSIS — Z12.11 SCREEN FOR COLON CANCER: Primary | ICD-10-CM

## 2019-11-19 ENCOUNTER — TELEPHONE (OUTPATIENT)
Dept: FAMILY MEDICINE | Facility: CLINIC | Age: 54
End: 2019-11-19

## 2019-11-19 DIAGNOSIS — F17.200 SMOKER: Primary | ICD-10-CM

## 2019-11-19 NOTE — TELEPHONE ENCOUNTER
----- Message from Ni Cruz sent at 11/19/2019  2:23 PM CST -----  Contact: Self   Type: Patient Call Back    Who called: Self     What is the request in detail:patient would like to know if he can get smoking patches to help stop smoking      Can the clinic reply by [facility]? No     Would the patient rather a call back or a response via [facility]?  Call     Best call back number: [phone]    Additional Information:    [id number] - [place] - [address] AT [facility] [address] [phone] (Phone)  [phone] (Fax)

## 2019-11-21 NOTE — TELEPHONE ENCOUNTER
I spoke to the pt and advised referral was placed for smoking cessation and he would be contacted to schedule. Pt verbalizes understanding.

## 2019-11-21 NOTE — TELEPHONE ENCOUNTER
Please let patient know that I placed a referral to smoking cessation program as they can help him with getting that

## 2019-11-26 ENCOUNTER — CLINICAL SUPPORT (OUTPATIENT)
Dept: SMOKING CESSATION | Facility: CLINIC | Age: 54
End: 2019-11-26
Payer: COMMERCIAL

## 2019-11-26 DIAGNOSIS — F17.200 NICOTINE DEPENDENCE: Primary | ICD-10-CM

## 2019-11-26 PROCEDURE — 99999 PR PBB SHADOW E&M-EST. PATIENT-LVL I: ICD-10-PCS | Mod: PBBFAC,,,

## 2019-11-26 PROCEDURE — 99404 PREV MED CNSL INDIV APPRX 60: CPT | Mod: S$GLB,,,

## 2019-11-26 PROCEDURE — 99404 PR PREVENT COUNSEL,INDIV,60 MIN: ICD-10-PCS | Mod: S$GLB,,,

## 2019-11-26 PROCEDURE — 99999 PR PBB SHADOW E&M-EST. PATIENT-LVL I: CPT | Mod: PBBFAC,,,

## 2019-11-26 RX ORDER — IBUPROFEN 200 MG
1 TABLET ORAL DAILY
Qty: 14 PATCH | Refills: 1 | Status: SHIPPED | OUTPATIENT
Start: 2019-11-26 | End: 2019-12-18 | Stop reason: SDUPTHER

## 2019-11-26 NOTE — PROGRESS NOTES
Patient will be participating in biweekly tobacco cessation meetings and will begin the prescribed tobacco cessation medication regime of  21 mg nicotine patch QD .  He currently smokes 10 -16 cigarettes per day.  Pt started on rate reduction and wait time of 15 min prior to smoking. Pt's exhaled carbon monoxide level was  14 ppm as per [facility]. (non- smoker = 0-5 ppm.) Will see pt back in office in 2 weeks.

## 2019-12-03 ENCOUNTER — CLINICAL SUPPORT (OUTPATIENT)
Dept: SMOKING CESSATION | Facility: CLINIC | Age: 54
End: 2019-12-03
Payer: COMMERCIAL

## 2019-12-03 DIAGNOSIS — F17.200 NICOTINE DEPENDENCE: Primary | ICD-10-CM

## 2019-12-03 PROCEDURE — 99402 PR PREVENT COUNSEL,INDIV,30 MIN: ICD-10-PCS | Mod: S$GLB,,,

## 2019-12-03 PROCEDURE — 99402 PREV MED CNSL INDIV APPRX 30: CPT | Mod: S$GLB,,,

## 2019-12-03 RX ORDER — DM/P-EPHED/ACETAMINOPH/DOXYLAM 30-7.5/3
LIQUID (ML) ORAL
Qty: 168 LOZENGE | Refills: 0 | Status: SHIPPED | OUTPATIENT
Start: 2019-12-03 | End: 2020-07-08

## 2019-12-03 NOTE — Clinical Note
Patient continues to smoke 1-2 cigarettes per day. Pt remains on tobacco cessation medication of  21 mg nicotine patch QD and today added 2 mg nicotine lozenge PRN (1-2 per hour in place of cigarettes.) Explained how to properly use the lozenge and patient verbalized understanding.    No adverse effects noted at this time. Pt doing well with rate reduction and wait times prior to smoking.  Pt encouraged to pick a quit day.  Reviewed coping strategies/habitual behavior/relapse prevention with patient.  Will see pt back in office in 2 weeks.

## 2019-12-04 NOTE — PROGRESS NOTES
Individual Follow-Up Form    12/3/2019    Quit Date: tbd    Clinical Status of Patient: Outpatient    Length of Service: 30 minutes    Continuing Medication: yes  Patches    Other Medications: none      Target Symptoms: Withdrawal and medication side effects. The following were  rated moderate (3) to severe (4) on TCRS:  · Moderate (3): none  · Severe (4): none    Comments: Telephonic visit .   Patient continues to smoke 1-2 cigarettes per day. Pt remains on tobacco cessation medication of  21 mg nicotine patch QD and today added 2 mg nicotine lozenge PRN (1-2 per hour in place of cigarettes.) Explained how to properly use the lozenge and patient verbalized understanding.    No adverse effects noted at this time. Pt doing well with rate reduction and wait times prior to smoking.  Pt encouraged to pick a quit day.  Reviewed coping strategies/habitual behavior/relapse prevention with patient.  Will see pt back in office in 2 weeks.       Diagnosis: F17.200    Next Visit: 2 weeks

## 2019-12-18 ENCOUNTER — CLINICAL SUPPORT (OUTPATIENT)
Dept: SMOKING CESSATION | Facility: CLINIC | Age: 54
End: 2019-12-18
Payer: COMMERCIAL

## 2019-12-18 DIAGNOSIS — F17.200 NICOTINE DEPENDENCE: ICD-10-CM

## 2019-12-18 PROCEDURE — 99401 PR PREVENT COUNSEL,INDIV,15 MIN: ICD-10-PCS | Mod: S$GLB,,,

## 2019-12-18 PROCEDURE — 99999 PR PBB SHADOW E&M-EST. PATIENT-LVL I: CPT | Mod: PBBFAC,,,

## 2019-12-18 PROCEDURE — 99999 PR PBB SHADOW E&M-EST. PATIENT-LVL I: ICD-10-PCS | Mod: PBBFAC,,,

## 2019-12-18 PROCEDURE — 99401 PREV MED CNSL INDIV APPRX 15: CPT | Mod: S$GLB,,,

## 2019-12-18 RX ORDER — IBUPROFEN 200 MG
1 TABLET ORAL DAILY
Qty: 28 PATCH | Refills: 0 | Status: SHIPPED | OUTPATIENT
Start: 2019-12-18 | End: 2020-07-08

## 2019-12-19 NOTE — PROGRESS NOTES
Individual Follow-Up Form    12/18/19    Quit Date: 1/2/20    Clinical Status of Patient: Outpatient    Length of Service: 15 minutes    Continuing Medication: yes  Patches or Nicotine Lozenges    Other Medications: none     Target Symptoms: Withdrawal and medication side effects. The following were  rated moderate (3) to severe (4) on TCRS:  · Moderate (3): none  · Severe (4): none    Comments: Telephonic visit .  Patient continues to smoke. Pt remains on tobacco cessation medication of  21 mg nicotine patch QD and 2 mg nicotine lozenge PRN (1-2 per hour in place of cigarettes.) No adverse effects noted at this time. Pt doing well with rate reduction and wait times prior to smoking.  Pt encouraged to pick a quit day.  Reviewed coping strategies/habitual behavior/relapse prevention with patient.  Will see pt back in office in 2 weeks.       Diagnosis: F17.200    Next Visit: 2 weeks

## 2020-03-10 ENCOUNTER — TELEPHONE (OUTPATIENT)
Dept: SMOKING CESSATION | Facility: CLINIC | Age: 55
End: 2020-03-10

## 2020-03-11 ENCOUNTER — CLINICAL SUPPORT (OUTPATIENT)
Dept: SMOKING CESSATION | Facility: CLINIC | Age: 55
End: 2020-03-11
Payer: COMMERCIAL

## 2020-03-11 DIAGNOSIS — F17.200 NICOTINE DEPENDENCE: Primary | ICD-10-CM

## 2020-03-11 PROCEDURE — 99407 PR TOBACCO USE CESSATION INTENSIVE >10 MINUTES: ICD-10-PCS | Mod: S$GLB,,,

## 2020-03-11 PROCEDURE — 99407 BEHAV CHNG SMOKING > 10 MIN: CPT | Mod: S$GLB,,,

## 2020-03-11 NOTE — PROGRESS NOTES
Spoke with patient today in regard to smoking cessation progress for 3 month phone follow up on quit 1. Patient not tobacco free at this time. Patient has scheduled an appointment to return to the program to continue his Quit attempt #1. Informed patient of benefit period, future follow ups, and contact information if any further help or support is needed. Will complete smart form for 3 month phone follow up on  Quit attempt #1.

## 2020-04-01 ENCOUNTER — PATIENT OUTREACH (OUTPATIENT)
Dept: ADMINISTRATIVE | Facility: HOSPITAL | Age: 55
End: 2020-04-01

## 2020-04-30 DIAGNOSIS — Z12.12 SCREENING FOR COLORECTAL CANCER: Primary | ICD-10-CM

## 2020-04-30 DIAGNOSIS — Z12.11 SCREENING FOR COLORECTAL CANCER: Primary | ICD-10-CM

## 2020-07-01 ENCOUNTER — CLINICAL SUPPORT (OUTPATIENT)
Dept: SMOKING CESSATION | Facility: CLINIC | Age: 55
End: 2020-07-01
Payer: COMMERCIAL

## 2020-07-01 DIAGNOSIS — F17.200 NICOTINE DEPENDENCE: Primary | ICD-10-CM

## 2020-07-01 PROCEDURE — 99407 PR TOBACCO USE CESSATION INTENSIVE >10 MINUTES: ICD-10-PCS | Mod: S$GLB,,,

## 2020-07-01 PROCEDURE — 99407 BEHAV CHNG SMOKING > 10 MIN: CPT | Mod: S$GLB,,,

## 2020-07-01 NOTE — PROGRESS NOTES
Spoke with patient today in regard to smoking cessation progress for 6 month phone follow up on quit 1.Patient not tobacco free at this time. Patient would like to scheduled an appointment to return to the program for Quit attempt #2 but needs to check with is wife on schedule. Informed patient of benefit period, future follow ups, and contact information if any further help or support is needed. Will  complete smart form for 6  Month on Quit attempt #1.

## 2020-07-08 ENCOUNTER — CLINICAL SUPPORT (OUTPATIENT)
Dept: SMOKING CESSATION | Facility: CLINIC | Age: 55
End: 2020-07-08
Payer: COMMERCIAL

## 2020-07-08 ENCOUNTER — TELEPHONE (OUTPATIENT)
Dept: FAMILY MEDICINE | Facility: CLINIC | Age: 55
End: 2020-07-08

## 2020-07-08 DIAGNOSIS — F17.200 NICOTINE DEPENDENCE: ICD-10-CM

## 2020-07-08 PROCEDURE — 99404 PREV MED CNSL INDIV APPRX 60: CPT | Mod: S$GLB,,,

## 2020-07-08 PROCEDURE — 99999 PR PBB SHADOW E&M-EST. PATIENT-LVL I: CPT | Mod: PBBFAC,,,

## 2020-07-08 PROCEDURE — 99404 PR PREVENT COUNSEL,INDIV,60 MIN: ICD-10-PCS | Mod: S$GLB,,,

## 2020-07-08 PROCEDURE — 99999 PR PBB SHADOW E&M-EST. PATIENT-LVL I: ICD-10-PCS | Mod: PBBFAC,,,

## 2020-07-08 RX ORDER — VARENICLINE TARTRATE 0.5 (11)-1
KIT ORAL
Qty: 53 TABLET | Refills: 0 | Status: SHIPPED | OUTPATIENT
Start: 2020-07-08 | End: 2020-07-26

## 2020-07-08 RX ORDER — IBUPROFEN 200 MG
1 TABLET ORAL DAILY
Qty: 28 PATCH | Refills: 0 | Status: SHIPPED | OUTPATIENT
Start: 2020-07-08

## 2020-07-08 RX ORDER — MICONAZOLE NITRATE 2 %
CREAM (GRAM) TOPICAL
Qty: 200 EACH | Refills: 0 | Status: SHIPPED | OUTPATIENT
Start: 2020-07-08

## 2020-07-08 NOTE — TELEPHONE ENCOUNTER
I left a message for the pt that it was MsTamar Mathur from smoking cessation that left him the message.

## 2020-07-08 NOTE — PROGRESS NOTES
Telephonic intake.  Patient will be participating in biweekly tobacco cessation meetings and will begin the prescribed tobacco cessation medication regime of Chantix starter pack  and 21 mg nicotine patch QD  Patient denies any low moods or SI/HI .  He currently smokes 10-15 cigarettes per day. JUSTICE-D score is 0.   Pt started on rate reduction and wait time of 15 min prior to smoking.  Will see pt back in office in 2 weeks.

## 2020-07-08 NOTE — Clinical Note
Patient will be participating in biweekly tobacco cessation meetings and will begin the prescribed tobacco cessation medication regime of Chantix starter pack  and 21 mg nicotine patch QD  Patient denies any low moods or SI/HI .  He currently smokes 10-15 cigarettes per day. JUSTICE-D score is 0.   Pt started on rate reduction and wait time of 15 min prior to smoking.  Will see pt back in office in 2 weeks.

## 2020-07-08 NOTE — TELEPHONE ENCOUNTER
----- Message from Melida Jimenez sent at 7/8/2020  2:46 PM CDT -----  Contact: Patient [phone]  Type:  Patient Returning Call    Who Called:  Patient    Who Left Message for Patient: Dr Dumont    Does the patient know what this is regarding?: No.    Would the patient rather a call back or a response via [facility]? Call back    Best Call Back Number: [phone]

## 2020-07-23 ENCOUNTER — TELEPHONE (OUTPATIENT)
Dept: SMOKING CESSATION | Facility: CLINIC | Age: 55
End: 2020-07-23

## 2020-07-23 ENCOUNTER — OFFICE VISIT (OUTPATIENT)
Dept: FAMILY MEDICINE | Facility: CLINIC | Age: 55
End: 2020-07-23
Payer: COMMERCIAL

## 2020-07-23 VITALS
HEART RATE: 79 BPM | OXYGEN SATURATION: 98 % | DIASTOLIC BLOOD PRESSURE: 96 MMHG | TEMPERATURE: 98 F | SYSTOLIC BLOOD PRESSURE: 138 MMHG | HEIGHT: 64 IN | WEIGHT: 162.25 LBS | BODY MASS INDEX: 27.7 KG/M2

## 2020-07-23 DIAGNOSIS — G89.29 CHRONIC RIGHT-SIDED LOW BACK PAIN WITH RIGHT-SIDED SCIATICA: ICD-10-CM

## 2020-07-23 DIAGNOSIS — Z11.4 ENCOUNTER FOR SCREENING FOR HIV: ICD-10-CM

## 2020-07-23 DIAGNOSIS — Z12.5 SCREENING FOR PROSTATE CANCER: ICD-10-CM

## 2020-07-23 DIAGNOSIS — F17.200 SMOKER: ICD-10-CM

## 2020-07-23 DIAGNOSIS — R03.0 TRANSIENT ELEVATED BLOOD PRESSURE: ICD-10-CM

## 2020-07-23 DIAGNOSIS — Z00.00 GENERAL MEDICAL EXAM: Primary | ICD-10-CM

## 2020-07-23 DIAGNOSIS — M54.41 CHRONIC RIGHT-SIDED LOW BACK PAIN WITH RIGHT-SIDED SCIATICA: ICD-10-CM

## 2020-07-23 PROCEDURE — 3008F PR BODY MASS INDEX (BMI) DOCUMENTED: ICD-10-PCS | Mod: CPTII,S$GLB,, | Performed by: FAMILY MEDICINE

## 2020-07-23 PROCEDURE — 99999 PR PBB SHADOW E&M-EST. PATIENT-LVL IV: ICD-10-PCS | Mod: PBBFAC,,, | Performed by: FAMILY MEDICINE

## 2020-07-23 PROCEDURE — 99396 PREV VISIT EST AGE 40-64: CPT | Mod: S$GLB,,, | Performed by: FAMILY MEDICINE

## 2020-07-23 PROCEDURE — 99396 PR PREVENTIVE VISIT,EST,40-64: ICD-10-PCS | Mod: S$GLB,,, | Performed by: FAMILY MEDICINE

## 2020-07-23 PROCEDURE — 99999 PR PBB SHADOW E&M-EST. PATIENT-LVL IV: CPT | Mod: PBBFAC,,, | Performed by: FAMILY MEDICINE

## 2020-07-23 PROCEDURE — 3008F BODY MASS INDEX DOCD: CPT | Mod: CPTII,S$GLB,, | Performed by: FAMILY MEDICINE

## 2020-07-23 RX ORDER — AMOXICILLIN 500 MG/1
CAPSULE ORAL
COMMUNITY
Start: 2020-06-11 | End: 2020-07-26

## 2020-07-23 RX ORDER — IBUPROFEN 600 MG/1
TABLET ORAL
COMMUNITY
Start: 2020-06-11 | End: 2020-07-23

## 2020-07-23 RX ORDER — MELOXICAM 15 MG/1
15 TABLET ORAL DAILY PRN
Qty: 90 TABLET | Refills: 0 | Status: SHIPPED | OUTPATIENT
Start: 2020-07-23 | End: 2020-10-13

## 2020-07-23 NOTE — TELEPHONE ENCOUNTER
[facility]- called patient  for  telephonic appointment. Left message to call back to reschedule [phone] or  [phone].

## 2020-07-23 NOTE — PROGRESS NOTES
"Subjective:       Patient ID: Bryn Camarillo Jr. is a 54 y.o. male.    Chief Complaint: Annual Exam    HPI   54 year old male comes in for annual exam. He states that he attended smoking cessation and was prescribed Chantix, but decided not to take it.     His only acute concern is back pain for the lat several months, approximately 9-12. It is on the right low back.and occasionally radiatesdown the right leg. The pain happens a few times a week. It is worsened with prolonged standing. It is improved with rest. He has not taken anything for it. He has never had evaluation for it. He has never had imaging done for it.     Review of Systems   Constitutional: Negative for unexpected weight change.   HENT: Negative for ear pain and sore throat.    Eyes: Negative for visual disturbance.   Respiratory: Negative for shortness of breath.    Cardiovascular: Negative for chest pain.   Gastrointestinal: Negative for abdominal pain and blood in stool.   Endocrine: Negative for cold intolerance and heat intolerance.   Genitourinary: Negative for dysuria and frequency.   Musculoskeletal: Positive for back pain. Negative for gait problem, joint swelling and joint deformity.   Integumentary:  Negative for rash.   Neurological: Negative for weakness, numbness and headaches.   Hematological: Negative for adenopathy.   Psychiatric/Behavioral: Negative for suicidal ideas.         Objective:     BP (!) 138/96 (BP Location: Right arm, Patient Position: Sitting, BP Method: Large (Manual))   Pulse 79   Temp 98 °F (36.7 °C) (Oral)   Ht 5' 4" (1.626 m)   Wt 73.6 kg (162 lb 4.1 oz)   SpO2 98%   BMI 27.85 kg/m²   Physical Exam  Vitals signs reviewed.   Constitutional:       General: He is not in acute distress.     Appearance: He is well-developed. He is not diaphoretic.   HENT:      Head: Normocephalic and atraumatic.      Right Ear: Tympanic membrane, ear canal and external ear normal.      Left Ear: Tympanic membrane, ear canal and " external ear normal.      Nose: Nose normal.   Eyes:      General:         Right eye: No discharge.         Left eye: No discharge.      Conjunctiva/sclera: Conjunctivae normal.      Pupils: Pupils are equal, round, and reactive to light.   Neck:      Musculoskeletal: Normal range of motion and neck supple.      Thyroid: No thyromegaly.      Trachea: No tracheal deviation.   Cardiovascular:      Rate and Rhythm: Normal rate and regular rhythm.      Pulses:           Radial pulses are 2+ on the right side and 2+ on the left side.      Heart sounds: Normal heart sounds, S1 normal and S2 normal. No murmur.   Pulmonary:      Effort: Pulmonary effort is normal. No respiratory distress.      Breath sounds: Normal breath sounds. No wheezing, rhonchi or rales.   Abdominal:      General: Bowel sounds are normal. There is no distension.      Palpations: Abdomen is soft. Abdomen is not rigid. There is no mass.      Tenderness: There is no abdominal tenderness. There is no guarding.   Musculoskeletal:      Lumbar back: He exhibits tenderness. He exhibits normal range of motion, no bony tenderness, no deformity and no spasm.   Lymphadenopathy:      Cervical: No cervical adenopathy.   Skin:     General: Skin is warm and dry.      Capillary Refill: Capillary refill takes less than 2 seconds.      Findings: No rash.   Neurological:      Mental Status: He is alert and oriented to person, place, and time.      Cranial Nerves: No cranial nerve deficit.      Sensory: No sensory deficit.      Motor: No atrophy or abnormal muscle tone.      Deep Tendon Reflexes:      Reflex Scores:       Patellar reflexes are 2+ on the right side and 2+ on the left side.  Psychiatric:         Behavior: Behavior normal.         Assessment:       1. General medical exam    2. Smoker    3. Chronic right-sided low back pain with right-sided sciatica    4. Transient elevated blood pressure    5. Screening for prostate cancer    6. Encounter for screening for  HIV        Plan:       Bryn was seen today for annual exam.    Diagnoses and all orders for this visit:    General medical exam  -     Comprehensive metabolic panel; Future  -     PSA, Screening; Future  -     HIV 1/2 Ag/Ab (4th Gen); Future  Age appropriate recommendations reviewed.  Screening labs ordered.    Smoker  Importance of smoking cessation discussed with patient. 5 minutes spent counseling patient on risks of smoking, benefits of stopping and ways of stopping. Patient not ready to quit.    Chronic right-sided low back pain with right-sided sciatica  -     meloxicam (MOBIC) 15 MG tablet; Take 1 tablet (15 mg total) by mouth daily as needed for Pain. Take with a meal  Advised using heating pad or hot towel in the area, but to not fall asleep with it as it could cause a burn.  Advised massaging and stretching the area.  Prescribed anti-inflammatory.  Side effects of muscle relaxer including sedation and impact on driving reviewed.  The patient was advised that NSAID-type medications have two very important potential side effects: gastrointestinal irritation including hemorrhage and renal injuries. He was asked to take the medication with food and to stop if he experiences any GI upset. I asked him to call for vomiting, abdominal pain or black/bloody stools. The patient expresses understanding of these issues and questions were answered.    Transient elevated blood pressure  Recheck blood pressure in 1-2 weeks    Screening for prostate cancer  -     PSA, Screening; Future    Encounter for screening for HIV  -     HIV 1/2 Ag/Ab (4th Gen); Future

## 2020-07-23 NOTE — PATIENT INSTRUCTIONS
Sciatica    Sciatica is a condition that causes pain in the lower back that spreads down into the buttock, hip, and leg. Sometimes the leg pain can happen without any back pain. Sciatica happens when a spinal nerve is irritated or has pressure put on it as comes out of the spinal canal in the lower back. This most often happens when a bulge or rupture of a nearby spinal disk presses on the nerve. Sciatica can also be caused by a narrowing of the spinal canal (spinal stenosis) or spasm of the muscle in the buttocks that the sciatic nerve passes through (pyriform muscle). Sciatica is also called lumbar radiculopathy.  Sciatica may begin after a sudden twisting or bending force, such as in a car accident. Or it can happen after a simple awkward movement. In either case, muscle spasm often also happens. Muscle spasm makes the pain worse.  A healthcare provider makes a diagnosis of sciatica from your symptoms and a physical exam. Unless you had an injury from a car accident or fall, you usually wont have X-rays taken at this time. This is because the nerves and disks in your back cant be seen on an X-ray. If the provider sees signs of a compressed nerve, you will need to schedule an MRI scan as an outpatient. Signs of a compressed nerve include loss of strength in a leg.  Most sciatica gets better with medicine, exercise, and physical therapy. If your symptoms continue after at least 3 months of medical treatment, you may need surgery or injections to your lower back.  Home care  Follow these tips when caring for yourself at home:  · You may need to stay in bed the first few days. But as soon as possible, begin sitting up or walking. This will help you avoid problems that come from staying in bed for long periods.  · When in bed, try to find a position that is comfortable. A firm mattress is best. Try lying flat on your back with pillows under your knees. You can also try lying on your side with your knees bent up  toward your chest and a pillow between your knees.  · Avoid sitting for long periods. This puts more stress on your lower back than standing or walking.  · Use heat from a hot shower, hot bath, or heating pad to help ease pain. Massage can also help. You can also try using an ice pack. You can make your own ice pack by putting ice cubes in a plastic bag. Wrap the bag in a thin towel. Try both heat and cold to see which works best. Use the method that feels best for 20 minutes several times a day.  · You may use acetaminophen or ibuprofen to ease pain, unless another pain medicine was prescribed. Note: If you have chronic liver or kidney disease, talk with your healthcare provider before taking these medicines. Also talk with your provider if youve had a stomach ulcer or gastrointestinal bleeding.  · Use safe lifting methods. Dont lift anything heavier than 15 pounds until all of the pain is gone.  Follow-up care  Follow up with your healthcare provider, or as advised. You may need physical therapy or additional tests.  If X-rays were taken, a radiologist will look at them. You will be told of any new findings that may affect your care.  When to seek medical advice  Call your healthcare provider right away if any of these occur:  · Pain gets worse even after taking prescribed medicine  · Weakness or numbness in 1 or both legs or hips  · Numbness in your groin or genital area  · You cant control your bowel or bladder  · Fever  · Redness or swelling over your back or spine   Date Last Reviewed: 8/1/2016  © 0983-7132 [employer]. [address] [place] [address], [place] [address]. All rights reserved. This information is not intended as a substitute for professional medical care. Always follow your healthcare professional's instructions.        Back Exercises: Lower Back Stretch    To start, sit in a chair with your feet flat on the floor. Shift your weight slightly forward. Relax, and keep your ears, shoulders,  and hips aligned.  · Sit with your feet well apart.  · Bend forward and touch the floor with the backs of your hands. Relax and let your body drop.  · Hold for 20 seconds. Return to starting position.  · Repeat 2 times.   Date Last Reviewed: 8/16/2015  © 7116-2153 [employer]. [address] [place] [address]. All rights reserved. This information is not intended as a substitute for professional medical care. Always follow your healthcare professional's instructions.        Back Exercises: Lower Back Rotation    To start, lie on your back with your knees bent and feet flat on the floor. Dont press your neck or lower back to the floor. Breathe deeply. You should feel comfortable and relaxed in this position.  · Drop both knees to one side. Turn your head to the other side. Keep your shoulders flat on the floor.  · Do not push through pain.  · Hold for 20 seconds.  · Slowly switch sides.  · Repeat 2 to 5 times.  Date Last Reviewed: 10/11/2015  © 1783-3639 [employer]. [address] [place] [address]. All rights reserved. This information is not intended as a substitute for professional medical care. Always follow your healthcare professional's instructions.

## 2020-07-27 ENCOUNTER — TELEPHONE (OUTPATIENT)
Dept: SMOKING CESSATION | Facility: CLINIC | Age: 55
End: 2020-07-27

## 2020-07-27 NOTE — TELEPHONE ENCOUNTER
[facility]- called patient  for  telephonic appointment. Left message to call back to reschedule [phone] or  [phone].

## 2021-01-06 ENCOUNTER — TELEPHONE (OUTPATIENT)
Dept: SMOKING CESSATION | Facility: CLINIC | Age: 56
End: 2021-01-06

## 2021-03-17 ENCOUNTER — TELEPHONE (OUTPATIENT)
Dept: SMOKING CESSATION | Facility: CLINIC | Age: 56
End: 2021-03-17

## 2021-09-15 ENCOUNTER — TELEPHONE (OUTPATIENT)
Dept: SMOKING CESSATION | Facility: CLINIC | Age: 56
End: 2021-09-15

## 2023-05-31 ENCOUNTER — PATIENT OUTREACH (OUTPATIENT)
Dept: ADMINISTRATIVE | Facility: HOSPITAL | Age: 58
End: 2023-05-31
Payer: COMMERCIAL

## 2024-04-03 NOTE — PROGRESS NOTES
Subjective:       Patient ID: Bryn Camarillo Jr. is a 52 y.o. male.    Chief Complaint: Laceration (Left forearm)    F/u for laceration left forearm (7/20/2018). Denies fever or chills.  Denies pain or discomfort      Laceration    The incident occurred 5 to 7 days ago. The laceration is located on the left arm. The patient is experiencing no pain. He reports no foreign bodies present. His tetanus status is UTD.     Review of Systems   Constitution: Negative for chills, fever and weakness.   HENT: Negative for congestion, ear pain, nosebleeds and tinnitus.    Eyes: Negative for blurred vision and pain.   Cardiovascular: Negative for chest pain and palpitations.   Respiratory: Negative for cough, shortness of breath and wheezing.    Hematologic/Lymphatic: Does not bruise/bleed easily.   Skin: Negative for dry skin, itching, poor wound healing, rash and skin cancer.        Laceration to the left FA   Musculoskeletal: Negative for back pain, joint pain, muscle weakness, neck pain and stiffness.   Gastrointestinal: Negative for abdominal pain, constipation, diarrhea, nausea and vomiting.   Genitourinary: Negative for dysuria and hematuria.   Neurological: Negative for dizziness, headaches, numbness and seizures.   Allergic/Immunologic: Negative for hives.   All other systems reviewed and are negative.      Objective:      Physical Exam   Constitutional: He is oriented to person, place, and time. He appears well-developed and well-nourished.   Cardiovascular: Normal rate and regular rhythm.    Pulmonary/Chest: Effort normal and breath sounds normal.   Abdominal: Soft. Bowel sounds are normal.   Musculoskeletal: Normal range of motion. He exhibits no tenderness.        Left forearm: He exhibits laceration. He exhibits no tenderness and no swelling.   LEFT FOREARM MS EXAM NORMAL.    Neurological: He is alert and oriented to person, place, and time. He displays normal reflexes. No cranial nerve deficit or sensory deficit.  He exhibits normal muscle tone. Coordination normal.   Skin: Skin is warm. Capillary refill takes less than 2 seconds. Laceration noted. No bruising and no ecchymosis noted. No erythema. No pallor.        Psychiatric: He has a normal mood and affect. His behavior is normal.       Assessment:       1. Laceration of left forearm, subsequent encounter    2. Work related injury        Plan:       Bryn was seen today for laceration.    Diagnoses and all orders for this visit:    Laceration of left forearm, subsequent encounter    Work related injury    SUTURE REMOVAL Monday 07/31/2018      Patient Instructions: Attention not to aggravate affected area, Keep dressing clean/dry/covered, Elevated affected area (okay to keep wound open to air at home after work.  do not wet wound.  Continue your Doxycycline )   Restrictions: No lifting/pushing/pulling more than 25 lbs, No above the shoulder/overhead work, Limited use of left hand and arm (Dressing to left arm while at work.  Must keep dressing clean and dry)  Follow-up in about 4 days (around 7/30/2018).       98.6 97.9